# Patient Record
Sex: MALE | Race: WHITE | Employment: OTHER | URBAN - METROPOLITAN AREA
[De-identification: names, ages, dates, MRNs, and addresses within clinical notes are randomized per-mention and may not be internally consistent; named-entity substitution may affect disease eponyms.]

---

## 2018-09-19 ENCOUNTER — OFFICE VISIT (OUTPATIENT)
Dept: FAMILY MEDICINE CLINIC | Age: 43
End: 2018-09-19
Payer: COMMERCIAL

## 2018-09-19 ENCOUNTER — HOSPITAL ENCOUNTER (OUTPATIENT)
Age: 43
Setting detail: SPECIMEN
Discharge: HOME | End: 2018-09-19
Payer: COMMERCIAL

## 2018-09-19 VITALS
OXYGEN SATURATION: 95 % | BODY MASS INDEX: 38.16 KG/M2 | SYSTOLIC BLOOD PRESSURE: 132 MMHG | HEART RATE: 95 BPM | HEIGHT: 69 IN | TEMPERATURE: 98.3 F | WEIGHT: 257.6 LBS | DIASTOLIC BLOOD PRESSURE: 78 MMHG

## 2018-09-19 DIAGNOSIS — E78.5 HYPERLIPIDEMIA, UNSPECIFIED HYPERLIPIDEMIA TYPE: ICD-10-CM

## 2018-09-19 DIAGNOSIS — F41.9 ANXIETY: ICD-10-CM

## 2018-09-19 DIAGNOSIS — Z23 NEED FOR TDAP VACCINATION: ICD-10-CM

## 2018-09-19 DIAGNOSIS — Z23 NEED FOR PNEUMOCOCCAL VACCINATION: ICD-10-CM

## 2018-09-19 DIAGNOSIS — E11.8 TYPE 2 DIABETES MELLITUS WITH COMPLICATION, UNSPECIFIED WHETHER LONG TERM INSULIN USE: Primary | ICD-10-CM

## 2018-09-19 DIAGNOSIS — Z23 NEED FOR INFLUENZA VACCINATION: ICD-10-CM

## 2018-09-19 DIAGNOSIS — G89.29 CHRONIC ABDOMINAL PAIN: ICD-10-CM

## 2018-09-19 DIAGNOSIS — Z13.29 SCREENING FOR THYROID DISORDER: ICD-10-CM

## 2018-09-19 DIAGNOSIS — M10.9 GOUT, UNSPECIFIED CAUSE, UNSPECIFIED CHRONICITY, UNSPECIFIED SITE: ICD-10-CM

## 2018-09-19 DIAGNOSIS — Z13.31 POSITIVE DEPRESSION SCREENING: ICD-10-CM

## 2018-09-19 DIAGNOSIS — Z80.0 FH: COLON CANCER IN FIRST DEGREE RELATIVE <60 YEARS OLD: ICD-10-CM

## 2018-09-19 DIAGNOSIS — R10.9 CHRONIC ABDOMINAL PAIN: ICD-10-CM

## 2018-09-19 PROBLEM — E11.9 DIABETES MELLITUS (HCC): Status: ACTIVE | Noted: 2018-09-19

## 2018-09-19 LAB
CHOLESTEROL/HDL RATIO: 8.5
CHOLESTEROL: 204 MG/DL
CREATININE URINE POCT: 200
HBA1C MFR BLD: 6.7 %
HDLC SERPL-MCNC: 24 MG/DL
LDL CHOLESTEROL DIRECT: 49 MG/DL
LDL CHOLESTEROL: ABNORMAL MG/DL (ref 0–130)
MICROALBUMIN/CREAT 24H UR: 150 MG/G{CREAT}
MICROALBUMIN/CREAT UR-RTO: ABNORMAL
TRIGL SERPL-MCNC: 1694 MG/DL
TSH SERPL DL<=0.05 MIU/L-ACNC: 3.05 MIU/L (ref 0.3–5)
VLDLC SERPL CALC-MCNC: ABNORMAL MG/DL (ref 1–30)

## 2018-09-19 PROCEDURE — 2022F DILAT RTA XM EVC RTNOPTHY: CPT | Performed by: NURSE PRACTITIONER

## 2018-09-19 PROCEDURE — G8417 CALC BMI ABV UP PARAM F/U: HCPCS | Performed by: NURSE PRACTITIONER

## 2018-09-19 PROCEDURE — 82044 UR ALBUMIN SEMIQUANTITATIVE: CPT | Performed by: NURSE PRACTITIONER

## 2018-09-19 PROCEDURE — 90715 TDAP VACCINE 7 YRS/> IM: CPT | Performed by: NURSE PRACTITIONER

## 2018-09-19 PROCEDURE — 90688 IIV4 VACCINE SPLT 0.5 ML IM: CPT | Performed by: NURSE PRACTITIONER

## 2018-09-19 PROCEDURE — 83036 HEMOGLOBIN GLYCOSYLATED A1C: CPT | Performed by: NURSE PRACTITIONER

## 2018-09-19 PROCEDURE — G0008 ADMIN INFLUENZA VIRUS VAC: HCPCS | Performed by: NURSE PRACTITIONER

## 2018-09-19 PROCEDURE — G0009 ADMIN PNEUMOCOCCAL VACCINE: HCPCS | Performed by: NURSE PRACTITIONER

## 2018-09-19 PROCEDURE — 3044F HG A1C LEVEL LT 7.0%: CPT | Performed by: NURSE PRACTITIONER

## 2018-09-19 PROCEDURE — G8427 DOCREV CUR MEDS BY ELIG CLIN: HCPCS | Performed by: NURSE PRACTITIONER

## 2018-09-19 PROCEDURE — 1036F TOBACCO NON-USER: CPT | Performed by: NURSE PRACTITIONER

## 2018-09-19 PROCEDURE — 90471 IMMUNIZATION ADMIN: CPT | Performed by: NURSE PRACTITIONER

## 2018-09-19 PROCEDURE — G8431 POS CLIN DEPRES SCRN F/U DOC: HCPCS | Performed by: NURSE PRACTITIONER

## 2018-09-19 PROCEDURE — 96160 PT-FOCUSED HLTH RISK ASSMT: CPT | Performed by: NURSE PRACTITIONER

## 2018-09-19 PROCEDURE — 99204 OFFICE O/P NEW MOD 45 MIN: CPT | Performed by: NURSE PRACTITIONER

## 2018-09-19 PROCEDURE — 90732 PPSV23 VACC 2 YRS+ SUBQ/IM: CPT | Performed by: NURSE PRACTITIONER

## 2018-09-19 RX ORDER — ONDANSETRON 4 MG/1
4 TABLET, FILM COATED ORAL EVERY 8 HOURS PRN
Qty: 40 TABLET | Refills: 1 | Status: SHIPPED | OUTPATIENT
Start: 2018-09-19 | End: 2018-11-13 | Stop reason: SDUPTHER

## 2018-09-19 RX ORDER — TAMSULOSIN HYDROCHLORIDE 0.4 MG/1
0.4 CAPSULE ORAL DAILY
COMMUNITY
End: 2018-09-19 | Stop reason: SDUPTHER

## 2018-09-19 RX ORDER — HYDROXYZINE HYDROCHLORIDE 25 MG/1
25 TABLET, FILM COATED ORAL EVERY 4 HOURS PRN
COMMUNITY
End: 2018-09-19 | Stop reason: SDUPTHER

## 2018-09-19 RX ORDER — OMEPRAZOLE 20 MG/1
20 CAPSULE, DELAYED RELEASE ORAL DAILY
Qty: 30 CAPSULE | Refills: 5 | Status: SHIPPED | OUTPATIENT
Start: 2018-09-19 | End: 2019-01-30 | Stop reason: SDUPTHER

## 2018-09-19 RX ORDER — AMOXICILLIN 500 MG
1200 CAPSULE ORAL 2 TIMES DAILY
COMMUNITY
End: 2018-09-19 | Stop reason: SDUPTHER

## 2018-09-19 RX ORDER — AMOXICILLIN 500 MG
1200 CAPSULE ORAL 2 TIMES DAILY
Qty: 60 CAPSULE | Refills: 5 | Status: SHIPPED | OUTPATIENT
Start: 2018-09-19 | End: 2019-03-12 | Stop reason: SDUPTHER

## 2018-09-19 RX ORDER — NIACIN 1000 MG
TABLET, EXTENDED RELEASE ORAL
COMMUNITY
End: 2018-09-19 | Stop reason: SDUPTHER

## 2018-09-19 RX ORDER — GLIMEPIRIDE 2 MG/1
1 TABLET ORAL 2 TIMES DAILY
Qty: 60 TABLET | Refills: 5 | Status: SHIPPED | OUTPATIENT
Start: 2018-09-19 | End: 2018-12-03 | Stop reason: SDUPTHER

## 2018-09-19 RX ORDER — SIMVASTATIN 80 MG
80 TABLET ORAL NIGHTLY
Qty: 30 TABLET | Refills: 5 | Status: SHIPPED | OUTPATIENT
Start: 2018-09-19 | End: 2018-12-03 | Stop reason: SDUPTHER

## 2018-09-19 RX ORDER — TAMSULOSIN HYDROCHLORIDE 0.4 MG/1
0.4 CAPSULE ORAL DAILY
Qty: 30 CAPSULE | Refills: 5 | Status: SHIPPED | OUTPATIENT
Start: 2018-09-19 | End: 2018-12-03 | Stop reason: SDUPTHER

## 2018-09-19 RX ORDER — GLIMEPIRIDE 2 MG/1
1 TABLET ORAL 2 TIMES DAILY
COMMUNITY
End: 2018-09-19 | Stop reason: SDUPTHER

## 2018-09-19 RX ORDER — ALBUTEROL SULFATE 90 UG/1
2 AEROSOL, METERED RESPIRATORY (INHALATION) EVERY 6 HOURS PRN
COMMUNITY
End: 2018-12-03 | Stop reason: SDUPTHER

## 2018-09-19 RX ORDER — ONDANSETRON 4 MG/1
4 TABLET, FILM COATED ORAL EVERY 8 HOURS PRN
COMMUNITY
End: 2018-09-19 | Stop reason: SDUPTHER

## 2018-09-19 RX ORDER — SIMVASTATIN 80 MG
80 TABLET ORAL NIGHTLY
COMMUNITY
End: 2018-09-19 | Stop reason: SDUPTHER

## 2018-09-19 RX ORDER — HYDROXYZINE HYDROCHLORIDE 25 MG/1
25 TABLET, FILM COATED ORAL EVERY 4 HOURS PRN
Qty: 120 TABLET | Refills: 5 | Status: SHIPPED | OUTPATIENT
Start: 2018-09-19 | End: 2018-12-20 | Stop reason: SDUPTHER

## 2018-09-19 RX ORDER — NIACIN 1000 MG
1000 TABLET, EXTENDED RELEASE ORAL DAILY
Qty: 30 TABLET | Refills: 5 | Status: SHIPPED | OUTPATIENT
Start: 2018-09-19 | End: 2019-01-30 | Stop reason: SDUPTHER

## 2018-09-19 RX ORDER — OMEPRAZOLE 20 MG/1
20 CAPSULE, DELAYED RELEASE ORAL DAILY
COMMUNITY
End: 2018-09-19 | Stop reason: SDUPTHER

## 2018-09-19 ASSESSMENT — PATIENT HEALTH QUESTIONNAIRE - PHQ9
7. TROUBLE CONCENTRATING ON THINGS, SUCH AS READING THE NEWSPAPER OR WATCHING TELEVISION: 1
4. FEELING TIRED OR HAVING LITTLE ENERGY: 3
9. THOUGHTS THAT YOU WOULD BE BETTER OFF DEAD, OR OF HURTING YOURSELF: 1
SUM OF ALL RESPONSES TO PHQ QUESTIONS 1-9: 16
8. MOVING OR SPEAKING SO SLOWLY THAT OTHER PEOPLE COULD HAVE NOTICED. OR THE OPPOSITE, BEING SO FIGETY OR RESTLESS THAT YOU HAVE BEEN MOVING AROUND A LOT MORE THAN USUAL: 2
6. FEELING BAD ABOUT YOURSELF - OR THAT YOU ARE A FAILURE OR HAVE LET YOURSELF OR YOUR FAMILY DOWN: 1
5. POOR APPETITE OR OVEREATING: 3
10. IF YOU CHECKED OFF ANY PROBLEMS, HOW DIFFICULT HAVE THESE PROBLEMS MADE IT FOR YOU TO DO YOUR WORK, TAKE CARE OF THINGS AT HOME, OR GET ALONG WITH OTHER PEOPLE: 1
SUM OF ALL RESPONSES TO PHQ9 QUESTIONS 1 & 2: 3
1. LITTLE INTEREST OR PLEASURE IN DOING THINGS: 2
SUM OF ALL RESPONSES TO PHQ QUESTIONS 1-9: 16
2. FEELING DOWN, DEPRESSED OR HOPELESS: 1
3. TROUBLE FALLING OR STAYING ASLEEP: 2

## 2018-09-19 ASSESSMENT — ENCOUNTER SYMPTOMS
SHORTNESS OF BREATH: 0
COUGH: 0

## 2018-09-19 NOTE — PROGRESS NOTES
On the basis of positive PHQ-9 screening (PHQ-9 Total Score: 16), the following plan was implemented: referral to psychiatry provided. Patient will follow-up in 1 month(s) with PCP.

## 2018-09-19 NOTE — PROGRESS NOTES
[address] [place]  [phone]    Ashwini Aldana is a 37 y.o. male who presents today for his  medical conditions/complaints as noted below. Ashwini Aldana is c/o of Establish Care    HPI:     HPI   Pietro Salidvar is here to establish. On disability for mood. Dm- eating is an issue, on food stamps- states he cant aford healthy foods. Last a1c was \"9 something\". a1c today is 6.7. doesnt test sugars frequently. Not getting any exercise \"i'm really lazy\". Just eats and plays video games. Pt reports he is having stomach issues. States two months ago he was having cramps near upper and left side abd. States he had muscle twitching. He was nauseous. Was seen in er for this problem. States problem has gotten better. He was given omeprazole and Zofran from er. These help and when he doesn't take them he can tell a difference. He has not been seen by GI. He does have a family h/o colon cancer, dad had two recurrences of colon cancer with the second one killing him in his early 63's. Not seeing anyone right now for psych. Trying to get in with someone new. He will call and make another apt because he now gets medical cab. Does not want to be restarted on wellbutrin or seroquel, states these gave him impotence and he would prefer to talk to psych. Nursing note reviewed and discussed with patient. Patient's medications, allergies, past medical, surgical, social and family histories were reviewed and updated as appropriate.     Current Outpatient Prescriptions   Medication Sig Dispense Refill    albuterol sulfate  (90 Base) MCG/ACT inhaler Inhale 2 puffs into the lungs every 6 hours as needed for Wheezing      Niacin ER 1000 MG TBCR Take 1,000 mg by mouth daily 30 tablet 5    tamsulosin (FLOMAX) 0.4 MG capsule Take 1 capsule by mouth daily 30 capsule 5    metFORMIN (GLUCOPHAGE) 1000 MG tablet Take 1 tablet by mouth 2 times 132/78 (Site: Left Upper Arm, Position: Sitting, Cuff Size: Medium Adult)   Pulse 95   Temp 98.3 °F (36.8 °C) (Oral)   Ht 5' 9\" (1.753 m)   Wt 257 lb 9.6 oz (116.8 kg)   SpO2 95%   BMI 38.04 kg/m²      Wt Readings from Last 3 Encounters:   09/19/18 257 lb 9.6 oz (116.8 kg)   05/05/16 265 lb (120.2 kg)       Physical Exam   Constitutional: He is oriented to person, place, and time. He appears well-developed and well-nourished. No distress. Cardiovascular: Normal rate, regular rhythm and normal heart sounds. Pulmonary/Chest: Effort normal and breath sounds normal.   Neurological: He is alert and oriented to person, place, and time. Skin: Skin is warm and dry. Psychiatric: He has a normal mood and affect. Assessment/PLAN     1. Type 2 diabetes mellitus with complication, unspecified whether long term insulin use (HCC)  Continue meds  Diet and exercise discussed  Good control today   - POCT glycosylated hemoglobin (Hb A1C)  - POCT microalbumin  - metFORMIN (GLUCOPHAGE) 1000 MG tablet; Take 1 tablet by mouth 2 times daily (with meals)  Dispense: 60 tablet; Refill: 5  - linagliptin (TRADJENTA) 5 MG tablet; Take 1 tablet by mouth daily  Dispense: 30 tablet; Refill: 5  - glimepiride (AMARYL) 2 MG tablet; Take 0.5 tablets by mouth 2 times daily  Dispense: 60 tablet; Refill: 5  - aspirin 81 MG tablet; Take 1 tablet by mouth daily  Dispense: 30 tablet; Refill: 5    2. Need for Tdap vaccination    - Tdap (age 10y-63y) IM (ADACEL)    3. Need for influenza vaccination    - INFLUENZA, QUADV, 3 YRS AND OLDER, IM, MDV, 0.5ML ([facility])    4. Need for pneumococcal vaccination    - Pneumococcal polysaccharide vaccine 23-valent greater than or equal to 1yo subcutaneous/IM    5. Gout, unspecified cause, unspecified chronicity, unspecified site  Stable     6. Hyperlipidemia, unspecified hyperlipidemia type    - Lipid Panel; Future  - simvastatin (ZOCOR) 80 MG tablet;  Take 1 tablet by mouth nightly  Dispense: 30 tablet; Refill: 5  - Omega-3 Fatty Acids (FISH OIL) 1200 MG CAPS; Take 1,200 mg by mouth 2 times daily  Dispense: 60 capsule; Refill: 5    7. Positive depression screening  Is going to call and make apt with psych   - Positive Screen for Clinical Depression with a Documented Follow-up Plan     8. Chronic abdominal pain  Feel it would be best that he be seen by gi   - ondansetron (ZOFRAN) 4 MG tablet; Take 1 tablet by mouth every 8 hours as needed for Nausea or Vomiting  Dispense: 40 tablet; Refill: 1  - omeprazole (PRILOSEC) 20 MG delayed release capsule; Take 1 capsule by mouth daily  Dispense: 30 capsule; Refill: 5  - Svetlana Blackburn MD, [facility] [place]    9. Anxiety    - hydrOXYzine (ATARAX) 25 MG tablet; Take 1 tablet by mouth every 4 hours as needed for Anxiety  Dispense: 120 tablet; Refill: 5    10. FH: colon cancer in first degree relative <61years old    - Svetlana Blackburn MD, [facility] [place]    11. Screening for thyroid disorder    - TSH; Future      RTO if symptoms worsen or fail to improve  Pt agreeable with plan      Patient given educational materials - see patient instructions. Discussed use, benefit, and side effects of prescribed medications. All patient questions answered. Pt voiced understanding. Reviewed health maintenance. Instructed to continue current medications, diet and exercise. 1.  Neal received counseling on the following healthy behaviors: nutrition, exercise and medication adherence  2. Patient given educational materials when available - see patient instructions when applicable  3. Discussed use, benefit, and side effects of prescribed medications. Barriers to medication compliance addressed. All patient questions answered. Pt voiced understanding. 4.  Reviewed prior labs and health maintenance  5. Continue current medications, diet and exercise.     Completed Refills   Requested Prescriptions     Signed Prescriptions Disp Refills

## 2018-09-19 NOTE — PROGRESS NOTES
Patient is present to establish care    Patient was going to [address] [place]  Patient was not happy with how he was treated there    Patient is requesting referral to GI  Patient states he has been having heartburn and loss of appetite   Patient also has family history of colon cancer    Patient is due for Lipid    Pharmacy and medication reviewed with patient  Patient brought medication bottles    Visit Information    Have you changed or started any medications since your last visit including any over-the-counter medicines, vitamins, or herbal medicines? no   Have you stopped taking any of your medications? Is so, why? -  no  Are you having any side effects from any of your medications? - no    Have you seen any other physician or provider since your last visit?  no   Have you had any other diagnostic tests since your last visit?  no   Have you been seen in the emergency room and/or had an admission in a hospital since we last saw you?  no   Have you had your routine dental cleaning in the past 6 months?  no     Do you have an active [facility] account? If no, what is the barrier?   Yes    No care team member to display    Medical History Review  Past Medical, Family, and Social History reviewed and does contribute to the patient presenting condition    Health Maintenance   Topic Date Due    Diabetic foot exam  06/16/1985    Diabetic retinal exam  06/16/1985    HIV screen  06/16/1990    DTaP/Tdap/Td vaccine (1 - Tdap) 06/16/1994    Pneumococcal med risk (1 of 1 - PPSV23) 06/16/1994    Diabetic microalbuminuria test  09/05/2013    Lipid screen  09/05/2013    A1C test (Diabetic or Prediabetic)  08/21/2015    Flu vaccine (1) 09/01/2018

## 2018-10-18 RX ORDER — FENOFIBRATE 48 MG/1
48 TABLET, COATED ORAL DAILY
Qty: 30 TABLET | Refills: 3 | Status: SHIPPED | OUTPATIENT
Start: 2018-10-18 | End: 2019-06-08 | Stop reason: SDUPTHER

## 2018-10-23 ENCOUNTER — OFFICE VISIT (OUTPATIENT)
Dept: GASTROENTEROLOGY | Age: 43
End: 2018-10-23
Payer: COMMERCIAL

## 2018-10-23 VITALS
BODY MASS INDEX: 38.17 KG/M2 | DIASTOLIC BLOOD PRESSURE: 80 MMHG | SYSTOLIC BLOOD PRESSURE: 135 MMHG | HEART RATE: 110 BPM | WEIGHT: 258.5 LBS

## 2018-10-23 DIAGNOSIS — R19.7 DIARRHEA, UNSPECIFIED TYPE: ICD-10-CM

## 2018-10-23 DIAGNOSIS — R11.2 NAUSEA AND VOMITING, INTRACTABILITY OF VOMITING NOT SPECIFIED, UNSPECIFIED VOMITING TYPE: ICD-10-CM

## 2018-10-23 DIAGNOSIS — R10.9 CHRONIC ABDOMINAL PAIN: Primary | ICD-10-CM

## 2018-10-23 DIAGNOSIS — G89.29 CHRONIC ABDOMINAL PAIN: Primary | ICD-10-CM

## 2018-10-23 PROCEDURE — 99204 OFFICE O/P NEW MOD 45 MIN: CPT | Performed by: INTERNAL MEDICINE

## 2018-10-23 PROCEDURE — G8417 CALC BMI ABV UP PARAM F/U: HCPCS | Performed by: INTERNAL MEDICINE

## 2018-10-23 PROCEDURE — G8427 DOCREV CUR MEDS BY ELIG CLIN: HCPCS | Performed by: INTERNAL MEDICINE

## 2018-10-23 PROCEDURE — G8482 FLU IMMUNIZE ORDER/ADMIN: HCPCS | Performed by: INTERNAL MEDICINE

## 2018-10-23 RX ORDER — POLYETHYLENE GLYCOL 3350 17 G/17G
POWDER, FOR SOLUTION ORAL
Qty: 255 G | Refills: 0 | Status: SHIPPED | OUTPATIENT
Start: 2018-10-23 | End: 2018-11-22

## 2018-10-23 ASSESSMENT — ENCOUNTER SYMPTOMS
RESPIRATORY NEGATIVE: 1
ABDOMINAL PAIN: 1
ANAL BLEEDING: 0
EYES NEGATIVE: 1
BLOOD IN STOOL: 0
VOMITING: 1
CONSTIPATION: 0
NAUSEA: 1
DIARRHEA: 0
ABDOMINAL DISTENTION: 0
RECTAL PAIN: 0

## 2018-10-23 NOTE — PROGRESS NOTES
for dizziness and light-headedness. Negative for weakness and headaches. Hematological: Does not bruise/bleed easily. Psychiatric/Behavioral: Positive for agitation, behavioral problems and sleep disturbance. The patient is nervous/anxious. Objective:   Physical Exam   Constitutional: He is oriented to person, place, and time. He appears well-developed and well-nourished. No distress. HENT:   Head: Normocephalic and atraumatic. Mouth/Throat: No oropharyngeal exudate. Eyes: Pupils are equal, round, and reactive to light. Conjunctivae are normal. No scleral icterus. Neck: Normal range of motion. Neck supple. No tracheal deviation present. No thyromegaly present. Cardiovascular: Normal rate, regular rhythm, normal heart sounds and intact distal pulses. No murmur heard. Pulmonary/Chest: Effort normal and breath sounds normal. No respiratory distress. He has no wheezes. He has no rales. Abdominal: Soft. Bowel sounds are normal. He exhibits no distension, no ascites and no mass. There is no hepatomegaly. There is no tenderness. There is no guarding. No hernia. No peripheral signs of ch. Liver disease   Genitourinary: Rectum normal.   Musculoskeletal: He exhibits no edema. Lymphadenopathy:     He has no cervical adenopathy. Neurological: He is alert and oriented to person, place, and time. No cranial nerve deficit. Skin: Skin is warm and dry. No rash noted. No erythema. Psychiatric: Thought content normal.   Nursing note and vitals reviewed. Assessment:       Diagnosis Orders   1. Chronic abdominal pain  Lipase    Comprehensive Metabolic Panel    CBC Auto Differential    EGD   2. Nausea and vomiting, intractability of vomiting not specified, unspecified vomiting type     3. Diarrhea, unspecified type  Tissue Transglutaminase, IgA    COLONOSCOPY W/ OR W/O BIOPSY           Plan:        Patient has vague symptoms.   Multiple etiologies explained to the patient including IBS, gastritis,

## 2018-10-31 ENCOUNTER — OFFICE VISIT (OUTPATIENT)
Dept: FAMILY MEDICINE CLINIC | Age: 43
End: 2018-10-31
Payer: COMMERCIAL

## 2018-10-31 VITALS
OXYGEN SATURATION: 95 % | BODY MASS INDEX: 38.6 KG/M2 | SYSTOLIC BLOOD PRESSURE: 130 MMHG | TEMPERATURE: 97 F | HEART RATE: 83 BPM | WEIGHT: 261.4 LBS | DIASTOLIC BLOOD PRESSURE: 86 MMHG

## 2018-10-31 DIAGNOSIS — F41.9 ANXIETY: ICD-10-CM

## 2018-10-31 DIAGNOSIS — R35.0 FREQUENT URINATION: ICD-10-CM

## 2018-10-31 DIAGNOSIS — Q55.61 CURVATURE OF THE PENIS: Primary | ICD-10-CM

## 2018-10-31 PROCEDURE — 1036F TOBACCO NON-USER: CPT | Performed by: NURSE PRACTITIONER

## 2018-10-31 PROCEDURE — G8417 CALC BMI ABV UP PARAM F/U: HCPCS | Performed by: NURSE PRACTITIONER

## 2018-10-31 PROCEDURE — G8482 FLU IMMUNIZE ORDER/ADMIN: HCPCS | Performed by: NURSE PRACTITIONER

## 2018-10-31 PROCEDURE — 99214 OFFICE O/P EST MOD 30 MIN: CPT | Performed by: NURSE PRACTITIONER

## 2018-10-31 PROCEDURE — G8427 DOCREV CUR MEDS BY ELIG CLIN: HCPCS | Performed by: NURSE PRACTITIONER

## 2018-10-31 RX ORDER — RANITIDINE 150 MG/1
150 CAPSULE ORAL
COMMUNITY
End: 2019-01-30

## 2018-10-31 ASSESSMENT — ENCOUNTER SYMPTOMS
COUGH: 0
SHORTNESS OF BREATH: 0

## 2018-11-02 ENCOUNTER — TELEPHONE (OUTPATIENT)
Dept: GASTROENTEROLOGY | Age: 43
End: 2018-11-02

## 2018-11-13 DIAGNOSIS — G89.29 CHRONIC ABDOMINAL PAIN: ICD-10-CM

## 2018-11-13 DIAGNOSIS — R10.9 CHRONIC ABDOMINAL PAIN: ICD-10-CM

## 2018-11-13 RX ORDER — ONDANSETRON 4 MG/1
4 TABLET, FILM COATED ORAL EVERY 8 HOURS PRN
Qty: 40 TABLET | Refills: 1 | Status: SHIPPED | OUTPATIENT
Start: 2018-11-13 | End: 2019-01-30 | Stop reason: SDUPTHER

## 2018-11-27 ENCOUNTER — TELEPHONE (OUTPATIENT)
Dept: UROLOGY | Age: 43
End: 2018-11-27

## 2018-12-03 DIAGNOSIS — E11.8 TYPE 2 DIABETES MELLITUS WITH COMPLICATION, UNSPECIFIED WHETHER LONG TERM INSULIN USE: ICD-10-CM

## 2018-12-03 DIAGNOSIS — E78.5 HYPERLIPIDEMIA, UNSPECIFIED HYPERLIPIDEMIA TYPE: ICD-10-CM

## 2018-12-03 RX ORDER — NIACIN 1000 MG/1
TABLET, EXTENDED RELEASE ORAL
Qty: 90 TABLET | Refills: 1 | Status: SHIPPED | OUTPATIENT
Start: 2018-12-03 | End: 2019-04-15 | Stop reason: SDUPTHER

## 2018-12-03 RX ORDER — GLIMEPIRIDE 2 MG/1
TABLET ORAL
Qty: 90 TABLET | Refills: 1 | Status: SHIPPED | OUTPATIENT
Start: 2018-12-03 | End: 2019-06-13 | Stop reason: SDUPTHER

## 2018-12-03 RX ORDER — TAMSULOSIN HYDROCHLORIDE 0.4 MG/1
0.4 CAPSULE ORAL DAILY
Qty: 90 CAPSULE | Refills: 1 | Status: SHIPPED | OUTPATIENT
Start: 2018-12-03 | End: 2019-06-08 | Stop reason: SDUPTHER

## 2018-12-03 RX ORDER — SIMVASTATIN 80 MG
TABLET ORAL
Qty: 90 TABLET | Refills: 1 | Status: SHIPPED | OUTPATIENT
Start: 2018-12-03 | End: 2019-06-13 | Stop reason: SDUPTHER

## 2018-12-04 ENCOUNTER — HOSPITAL ENCOUNTER (OUTPATIENT)
Age: 43
Discharge: HOME | End: 2018-12-04
Payer: COMMERCIAL

## 2018-12-04 ENCOUNTER — OFFICE VISIT (OUTPATIENT)
Dept: GASTROENTEROLOGY | Age: 43
End: 2018-12-04
Payer: COMMERCIAL

## 2018-12-04 VITALS
BODY MASS INDEX: 37.95 KG/M2 | SYSTOLIC BLOOD PRESSURE: 134 MMHG | HEART RATE: 65 BPM | DIASTOLIC BLOOD PRESSURE: 84 MMHG | WEIGHT: 257 LBS

## 2018-12-04 DIAGNOSIS — R10.9 CHRONIC ABDOMINAL PAIN: Primary | ICD-10-CM

## 2018-12-04 DIAGNOSIS — G89.29 CHRONIC ABDOMINAL PAIN: ICD-10-CM

## 2018-12-04 DIAGNOSIS — R19.7 DIARRHEA, UNSPECIFIED TYPE: ICD-10-CM

## 2018-12-04 DIAGNOSIS — G89.29 CHRONIC ABDOMINAL PAIN: Primary | ICD-10-CM

## 2018-12-04 DIAGNOSIS — R10.9 CHRONIC ABDOMINAL PAIN: ICD-10-CM

## 2018-12-04 LAB
ABSOLUTE EOS #: 0.1 K/UL (ref 0–0.4)
ABSOLUTE IMMATURE GRANULOCYTE: ABNORMAL K/UL (ref 0–0.3)
ABSOLUTE LYMPH #: 2.6 K/UL (ref 1–4.8)
ABSOLUTE MONO #: 0.3 K/UL (ref 0.1–1.3)
ALBUMIN SERPL-MCNC: 4.5 G/DL (ref 3.5–5.2)
ALBUMIN/GLOBULIN RATIO: ABNORMAL (ref 1–2.5)
ALP BLD-CCNC: 68 U/L (ref 40–129)
ALT SERPL-CCNC: 32 U/L (ref 5–41)
ANION GAP SERPL CALCULATED.3IONS-SCNC: 13 MMOL/L (ref 9–17)
AST SERPL-CCNC: 24 U/L
BASOPHILS # BLD: 1 % (ref 0–2)
BASOPHILS ABSOLUTE: 0 K/UL (ref 0–0.2)
BILIRUB SERPL-MCNC: 0.54 MG/DL (ref 0.3–1.2)
BUN BLDV-MCNC: 9 MG/DL (ref 6–20)
BUN/CREAT BLD: ABNORMAL (ref 9–20)
CALCIUM SERPL-MCNC: 9.9 MG/DL (ref 8.6–10.4)
CHLORIDE BLD-SCNC: 101 MMOL/L (ref 98–107)
CO2: 26 MMOL/L (ref 20–31)
CREAT SERPL-MCNC: 0.85 MG/DL (ref 0.7–1.2)
DIFFERENTIAL TYPE: ABNORMAL
EOSINOPHILS RELATIVE PERCENT: 2 % (ref 0–4)
GFR AFRICAN AMERICAN: >60 ML/MIN
GFR NON-AFRICAN AMERICAN: >60 ML/MIN
GFR SERPL CREATININE-BSD FRML MDRD: ABNORMAL ML/MIN/{1.73_M2}
GFR SERPL CREATININE-BSD FRML MDRD: ABNORMAL ML/MIN/{1.73_M2}
GLUCOSE BLD-MCNC: 138 MG/DL (ref 70–99)
HCT VFR BLD CALC: 47.2 % (ref 41–53)
HEMOGLOBIN: 16.1 G/DL (ref 13.5–17.5)
IMMATURE GRANULOCYTES: ABNORMAL %
LIPASE: 33 U/L (ref 13–60)
LYMPHOCYTES # BLD: 47 % (ref 24–44)
MCH RBC QN AUTO: 29.9 PG (ref 26–34)
MCHC RBC AUTO-ENTMCNC: 34.1 G/DL (ref 31–37)
MCV RBC AUTO: 87.7 FL (ref 80–100)
MONOCYTES # BLD: 5 % (ref 1–7)
NRBC AUTOMATED: ABNORMAL PER 100 WBC
PDW BLD-RTO: 14.3 % (ref 11.5–14.9)
PLATELET # BLD: 142 K/UL (ref 150–450)
PLATELET ESTIMATE: ABNORMAL
PMV BLD AUTO: 9.4 FL (ref 6–12)
POTASSIUM SERPL-SCNC: 3.7 MMOL/L (ref 3.7–5.3)
RBC # BLD: 5.38 M/UL (ref 4.5–5.9)
RBC # BLD: ABNORMAL 10*6/UL
SEG NEUTROPHILS: 45 % (ref 36–66)
SEGMENTED NEUTROPHILS ABSOLUTE COUNT: 2.4 K/UL (ref 1.3–9.1)
SODIUM BLD-SCNC: 140 MMOL/L (ref 135–144)
TOTAL PROTEIN: 7.1 G/DL (ref 6.4–8.3)
WBC # BLD: 5.5 K/UL (ref 3.5–11)
WBC # BLD: ABNORMAL 10*3/UL

## 2018-12-04 PROCEDURE — 83516 IMMUNOASSAY NONANTIBODY: CPT

## 2018-12-04 PROCEDURE — G8482 FLU IMMUNIZE ORDER/ADMIN: HCPCS | Performed by: INTERNAL MEDICINE

## 2018-12-04 PROCEDURE — 83690 ASSAY OF LIPASE: CPT

## 2018-12-04 PROCEDURE — 1036F TOBACCO NON-USER: CPT | Performed by: INTERNAL MEDICINE

## 2018-12-04 PROCEDURE — 80053 COMPREHEN METABOLIC PANEL: CPT

## 2018-12-04 PROCEDURE — 85025 COMPLETE CBC W/AUTO DIFF WBC: CPT

## 2018-12-04 PROCEDURE — 99214 OFFICE O/P EST MOD 30 MIN: CPT | Performed by: INTERNAL MEDICINE

## 2018-12-04 PROCEDURE — G8417 CALC BMI ABV UP PARAM F/U: HCPCS | Performed by: INTERNAL MEDICINE

## 2018-12-04 PROCEDURE — 36415 COLL VENOUS BLD VENIPUNCTURE: CPT

## 2018-12-04 PROCEDURE — G8427 DOCREV CUR MEDS BY ELIG CLIN: HCPCS | Performed by: INTERNAL MEDICINE

## 2018-12-04 ASSESSMENT — ENCOUNTER SYMPTOMS
VOMITING: 1
COUGH: 0
ABDOMINAL DISTENTION: 0
SHORTNESS OF BREATH: 0
RESPIRATORY NEGATIVE: 1
EYES NEGATIVE: 1
RECTAL PAIN: 0
BLOOD IN STOOL: 0
TROUBLE SWALLOWING: 0
ANAL BLEEDING: 0
CONSTIPATION: 0
DIARRHEA: 0
ABDOMINAL PAIN: 1
NAUSEA: 1

## 2018-12-05 LAB — TISSUE TRANSGLUTAMINASE IGA: 0.4 U/ML

## 2018-12-05 RX ORDER — OMEPRAZOLE/SODIUM BICARBONATE 20MG-1.1G
CAPSULE ORAL
Qty: 90 CAPSULE | Refills: 1 | Status: SHIPPED | OUTPATIENT
Start: 2018-12-05 | End: 2019-03-25 | Stop reason: SDUPTHER

## 2018-12-11 RX ORDER — ONDANSETRON 4 MG/1
TABLET, ORALLY DISINTEGRATING ORAL
Qty: 90 TABLET | Refills: 1 | OUTPATIENT
Start: 2018-12-11

## 2018-12-20 DIAGNOSIS — F41.9 ANXIETY: ICD-10-CM

## 2018-12-21 RX ORDER — METFORMIN HYDROCHLORIDE EXTENDED-RELEASE TABLETS 1000 MG/1
TABLET, FILM COATED, EXTENDED RELEASE ORAL
Qty: 180 TABLET | Refills: 1 | Status: SHIPPED | OUTPATIENT
Start: 2018-12-21 | End: 2019-06-13 | Stop reason: SDUPTHER

## 2018-12-21 RX ORDER — HYDROXYZINE HYDROCHLORIDE 25 MG/1
TABLET, FILM COATED ORAL
Qty: 240 TABLET | Refills: 0 | Status: SHIPPED | OUTPATIENT
Start: 2018-12-21 | End: 2019-01-30 | Stop reason: SDUPTHER

## 2018-12-21 RX ORDER — LANCING DEVICE
EACH MISCELLANEOUS
Qty: 300 EACH | Refills: 5 | Status: SHIPPED | OUTPATIENT
Start: 2018-12-21 | End: 2021-02-25 | Stop reason: ALTCHOICE

## 2018-12-21 RX ORDER — CALCIUM CITRATE/VITAMIN D3 200MG-6.25
TABLET ORAL
Qty: 300 EACH | Refills: 5 | Status: SHIPPED | OUTPATIENT
Start: 2018-12-21 | End: 2021-02-25

## 2018-12-21 RX ORDER — LANCING DEVICE
EACH MISCELLANEOUS
Qty: 180 EACH | Refills: 1 | Status: SHIPPED | OUTPATIENT
Start: 2018-12-21 | End: 2021-02-25

## 2019-01-30 ENCOUNTER — OFFICE VISIT (OUTPATIENT)
Dept: FAMILY MEDICINE CLINIC | Age: 44
End: 2019-01-30
Payer: COMMERCIAL

## 2019-01-30 DIAGNOSIS — E11.8 TYPE 2 DIABETES MELLITUS WITH COMPLICATION, UNSPECIFIED WHETHER LONG TERM INSULIN USE: ICD-10-CM

## 2019-01-30 DIAGNOSIS — F31.9 BIPOLAR 1 DISORDER (HCC): Primary | ICD-10-CM

## 2019-01-30 DIAGNOSIS — R10.9 CHRONIC ABDOMINAL PAIN: ICD-10-CM

## 2019-01-30 DIAGNOSIS — K58.9 IRRITABLE BOWEL SYNDROME WITHOUT DIARRHEA: ICD-10-CM

## 2019-01-30 DIAGNOSIS — F41.9 ANXIETY: ICD-10-CM

## 2019-01-30 DIAGNOSIS — G89.29 CHRONIC ABDOMINAL PAIN: ICD-10-CM

## 2019-01-30 LAB — HBA1C MFR BLD: 7.2 %

## 2019-01-30 PROCEDURE — 2022F DILAT RTA XM EVC RTNOPTHY: CPT | Performed by: NURSE PRACTITIONER

## 2019-01-30 PROCEDURE — G8482 FLU IMMUNIZE ORDER/ADMIN: HCPCS | Performed by: NURSE PRACTITIONER

## 2019-01-30 PROCEDURE — 3045F PR MOST RECENT HEMOGLOBIN A1C LEVEL 7.0-9.0%: CPT | Performed by: NURSE PRACTITIONER

## 2019-01-30 PROCEDURE — G8427 DOCREV CUR MEDS BY ELIG CLIN: HCPCS | Performed by: NURSE PRACTITIONER

## 2019-01-30 PROCEDURE — 83036 HEMOGLOBIN GLYCOSYLATED A1C: CPT | Performed by: NURSE PRACTITIONER

## 2019-01-30 PROCEDURE — 1036F TOBACCO NON-USER: CPT | Performed by: NURSE PRACTITIONER

## 2019-01-30 PROCEDURE — G8417 CALC BMI ABV UP PARAM F/U: HCPCS | Performed by: NURSE PRACTITIONER

## 2019-01-30 PROCEDURE — 99214 OFFICE O/P EST MOD 30 MIN: CPT | Performed by: NURSE PRACTITIONER

## 2019-01-30 RX ORDER — RISPERIDONE 1 MG/1
1 TABLET, FILM COATED ORAL DAILY
Qty: 30 TABLET | Refills: 2 | Status: SHIPPED | OUTPATIENT
Start: 2019-01-30 | End: 2019-03-12

## 2019-01-30 RX ORDER — HYDROXYZINE 50 MG/1
50 TABLET, FILM COATED ORAL EVERY 6 HOURS PRN
Qty: 360 TABLET | Refills: 1 | Status: SHIPPED | OUTPATIENT
Start: 2019-01-30 | End: 2019-06-13 | Stop reason: SDUPTHER

## 2019-01-30 RX ORDER — ONDANSETRON 4 MG/1
4 TABLET, FILM COATED ORAL EVERY 8 HOURS PRN
Qty: 120 TABLET | Refills: 1 | Status: SHIPPED | OUTPATIENT
Start: 2019-01-30 | End: 2019-04-15 | Stop reason: SDUPTHER

## 2019-01-30 ASSESSMENT — ENCOUNTER SYMPTOMS
SHORTNESS OF BREATH: 0
COUGH: 0

## 2019-01-31 VITALS
WEIGHT: 261.2 LBS | HEART RATE: 93 BPM | OXYGEN SATURATION: 100 % | SYSTOLIC BLOOD PRESSURE: 140 MMHG | BODY MASS INDEX: 38.57 KG/M2 | DIASTOLIC BLOOD PRESSURE: 84 MMHG | TEMPERATURE: 97.8 F

## 2019-03-12 ENCOUNTER — OFFICE VISIT (OUTPATIENT)
Dept: FAMILY MEDICINE CLINIC | Age: 44
End: 2019-03-12
Payer: COMMERCIAL

## 2019-03-12 VITALS
TEMPERATURE: 97.7 F | OXYGEN SATURATION: 97 % | HEART RATE: 85 BPM | WEIGHT: 261 LBS | BODY MASS INDEX: 38.54 KG/M2 | SYSTOLIC BLOOD PRESSURE: 134 MMHG | DIASTOLIC BLOOD PRESSURE: 86 MMHG

## 2019-03-12 DIAGNOSIS — E78.5 HYPERLIPIDEMIA, UNSPECIFIED HYPERLIPIDEMIA TYPE: ICD-10-CM

## 2019-03-12 DIAGNOSIS — E11.8 TYPE 2 DIABETES MELLITUS WITH COMPLICATION, UNSPECIFIED WHETHER LONG TERM INSULIN USE: ICD-10-CM

## 2019-03-12 DIAGNOSIS — K58.9 IRRITABLE BOWEL SYNDROME WITHOUT DIARRHEA: ICD-10-CM

## 2019-03-12 DIAGNOSIS — R09.82 PND (POST-NASAL DRIP): Primary | ICD-10-CM

## 2019-03-12 PROCEDURE — 2022F DILAT RTA XM EVC RTNOPTHY: CPT | Performed by: NURSE PRACTITIONER

## 2019-03-12 PROCEDURE — G8427 DOCREV CUR MEDS BY ELIG CLIN: HCPCS | Performed by: NURSE PRACTITIONER

## 2019-03-12 PROCEDURE — G8482 FLU IMMUNIZE ORDER/ADMIN: HCPCS | Performed by: NURSE PRACTITIONER

## 2019-03-12 PROCEDURE — 1036F TOBACCO NON-USER: CPT | Performed by: NURSE PRACTITIONER

## 2019-03-12 PROCEDURE — 99214 OFFICE O/P EST MOD 30 MIN: CPT | Performed by: NURSE PRACTITIONER

## 2019-03-12 PROCEDURE — G8417 CALC BMI ABV UP PARAM F/U: HCPCS | Performed by: NURSE PRACTITIONER

## 2019-03-12 PROCEDURE — 3045F PR MOST RECENT HEMOGLOBIN A1C LEVEL 7.0-9.0%: CPT | Performed by: NURSE PRACTITIONER

## 2019-03-12 RX ORDER — AMOXICILLIN 500 MG
1200 CAPSULE ORAL 2 TIMES DAILY
Qty: 90 CAPSULE | Refills: 1 | Status: SHIPPED | OUTPATIENT
Start: 2019-03-12 | End: 2019-06-11 | Stop reason: SDUPTHER

## 2019-03-12 RX ORDER — CETIRIZINE HYDROCHLORIDE 10 MG/1
10 TABLET ORAL DAILY
Qty: 30 TABLET | Refills: 3 | Status: SHIPPED | OUTPATIENT
Start: 2019-03-12 | End: 2019-06-13 | Stop reason: SDUPTHER

## 2019-03-12 RX ORDER — OCTISALATE, AVOBENZONE, HOMOSALATE, AND OCTOCRYLENE 29.4; 29.4; 49; 25.48 MG/ML; MG/ML; MG/ML; MG/ML
1 LOTION TOPICAL DAILY
Qty: 30 CAPSULE | Refills: 5 | Status: SHIPPED | OUTPATIENT
Start: 2019-03-12 | End: 2020-02-12 | Stop reason: SDUPTHER

## 2019-03-12 ASSESSMENT — ENCOUNTER SYMPTOMS
COUGH: 0
SHORTNESS OF BREATH: 0

## 2019-03-19 RX ORDER — NIACIN 1000 MG/1
TABLET, EXTENDED RELEASE ORAL
Qty: 90 TABLET | Refills: 1 | OUTPATIENT
Start: 2019-03-19

## 2019-03-25 DIAGNOSIS — R11.2 NAUSEA AND VOMITING, INTRACTABILITY OF VOMITING NOT SPECIFIED, UNSPECIFIED VOMITING TYPE: ICD-10-CM

## 2019-03-25 DIAGNOSIS — G89.29 CHRONIC ABDOMINAL PAIN: Primary | ICD-10-CM

## 2019-03-25 DIAGNOSIS — R10.9 CHRONIC ABDOMINAL PAIN: Primary | ICD-10-CM

## 2019-03-25 RX ORDER — OMEPRAZOLE/SODIUM BICARBONATE 20MG-1.1G
CAPSULE ORAL
Qty: 90 CAPSULE | Refills: 1 | Status: SHIPPED | OUTPATIENT
Start: 2019-03-25 | End: 2019-09-10

## 2019-04-15 DIAGNOSIS — G89.29 CHRONIC ABDOMINAL PAIN: ICD-10-CM

## 2019-04-15 DIAGNOSIS — R10.9 CHRONIC ABDOMINAL PAIN: ICD-10-CM

## 2019-04-16 RX ORDER — ONDANSETRON 4 MG/1
4 TABLET, FILM COATED ORAL EVERY 8 HOURS PRN
Qty: 120 TABLET | Refills: 1 | Status: SHIPPED | OUTPATIENT
Start: 2019-04-16 | End: 2019-07-02 | Stop reason: SDUPTHER

## 2019-04-16 RX ORDER — NIACIN 1000 MG/1
TABLET, EXTENDED RELEASE ORAL
Qty: 90 TABLET | Refills: 1 | Status: SHIPPED | OUTPATIENT
Start: 2019-04-16 | End: 2019-09-26 | Stop reason: SDUPTHER

## 2019-05-29 ENCOUNTER — TELEPHONE (OUTPATIENT)
Dept: FAMILY MEDICINE CLINIC | Age: 44
End: 2019-05-29

## 2019-05-29 NOTE — TELEPHONE ENCOUNTER
I received a call from Piter Sy at [facility] @ [phone]. Calling to verify if the pt used any other medication prior to: Albuterol?

## 2019-06-07 DIAGNOSIS — F41.9 ANXIETY: ICD-10-CM

## 2019-06-07 DIAGNOSIS — F31.9 BIPOLAR 1 DISORDER (HCC): ICD-10-CM

## 2019-06-10 RX ORDER — RISPERIDONE 1 MG/1
1 TABLET, FILM COATED ORAL DAILY
Qty: 30 TABLET | Refills: 2 | OUTPATIENT
Start: 2019-06-10

## 2019-06-10 RX ORDER — TAMSULOSIN HYDROCHLORIDE 0.4 MG/1
0.4 CAPSULE ORAL DAILY
Qty: 90 CAPSULE | Refills: 1 | Status: SHIPPED | OUTPATIENT
Start: 2019-06-10 | End: 2020-02-12 | Stop reason: SDUPTHER

## 2019-06-10 RX ORDER — FENOFIBRATE 48 MG/1
48 TABLET, COATED ORAL DAILY
Qty: 90 TABLET | Refills: 1 | Status: SHIPPED | OUTPATIENT
Start: 2019-06-10 | End: 2020-02-12 | Stop reason: SDUPTHER

## 2019-06-10 NOTE — TELEPHONE ENCOUNTER
Last visit: 3/12/19  Last Med refill: 10/18/18, 12/3/18  Does patient have enough medication for 72 hours: No:     Next Visit Date:  Future Appointments   Date Time Provider Sonny Arenas   6/13/2019  1:30 PM Suzanna Damon APRN - CNP [facility] [address] Maintenance   Topic Date Due    Diabetic foot exam  06/16/1985    Hepatitis B Vaccine (1 of 3 - Risk 3-dose series) 06/16/1994    Diabetic retinal exam  09/19/2019 (Originally 6/16/1985)    HIV screen  09/19/2019 (Originally 6/16/1990)    Diabetic microalbuminuria test  09/19/2019    Lipid screen  09/19/2019    A1C test (Diabetic or Prediabetic)  01/30/2020    DTaP/Tdap/Td vaccine (2 - Td) 09/19/2028    Flu vaccine  Completed    Pneumococcal 0-64 years Vaccine  Completed       Hemoglobin A1C (%)   Date Value   01/30/2019 7.2   09/19/2018 6.7   08/21/2014 7.2 (H)             ( goal A1C is < 7)   Microalb/Crt. Ratio (mcg/mg creat)   Date Value   09/05/2012 27     LDL Cholesterol (mg/dL)   Date Value   09/19/2018 09/05/2012 Unable to calc.  as TRIG>400       (goal LDL is <100)   AST (U/L)   Date Value   12/04/2018 24     ALT (U/L)   Date Value   12/04/2018 32     BUN (mg/dL)   Date Value   12/04/2018 9     BP Readings from Last 3 Encounters:   03/12/19 134/86   01/30/19 (!) 140/84   12/04/18 134/84          (goal 120/80)    All Future Testing planned in [facility]  Lab Frequency Next Occurrence               Patient Active Problem List:     Diabetes mellitus ([address]     Gout     Hyperlipidemia     Chronic abdominal pain     Nausea & vomiting     Anxiety     Curvature of the penis     Bipolar 1 disorder ([address]

## 2019-06-11 DIAGNOSIS — E78.5 HYPERLIPIDEMIA, UNSPECIFIED HYPERLIPIDEMIA TYPE: ICD-10-CM

## 2019-06-11 NOTE — TELEPHONE ENCOUNTER
Last visit: 3/12/19  Last Med refill: 3/12/19  Does patient have enough medication for 72 hours: No:     Next Visit Date:  Future Appointments   Date Time Provider Sonny Arenas   6/13/2019  1:30 PM CLIF Curran - CNP [facility] [address] Maintenance   Topic Date Due    Diabetic foot exam  06/16/1985    Hepatitis B Vaccine (1 of 3 - Risk 3-dose series) 06/16/1994    Diabetic retinal exam  09/19/2019 (Originally 6/16/1985)    HIV screen  09/19/2019 (Originally 6/16/1990)    Diabetic microalbuminuria test  09/19/2019    Lipid screen  09/19/2019    A1C test (Diabetic or Prediabetic)  01/30/2020    DTaP/Tdap/Td vaccine (2 - Td) 09/19/2028    Flu vaccine  Completed    Pneumococcal 0-64 years Vaccine  Completed       Hemoglobin A1C (%)   Date Value   01/30/2019 7.2   09/19/2018 6.7   08/21/2014 7.2 (H)             ( goal A1C is < 7)   Microalb/Crt. Ratio (mcg/mg creat)   Date Value   09/05/2012 27     LDL Cholesterol (mg/dL)   Date Value   09/19/2018 09/05/2012 Unable to calc.  as TRIG>400       (goal LDL is <100)   AST (U/L)   Date Value   12/04/2018 24     ALT (U/L)   Date Value   12/04/2018 32     BUN (mg/dL)   Date Value   12/04/2018 9     BP Readings from Last 3 Encounters:   03/12/19 134/86   01/30/19 (!) 140/84   12/04/18 134/84          (goal 120/80)    All Future Testing planned in [facility]  Lab Frequency Next Occurrence               Patient Active Problem List:     Diabetes mellitus ([address]     Gout     Hyperlipidemia     Chronic abdominal pain     Nausea & vomiting     Anxiety     Curvature of the penis     Bipolar 1 disorder ([address]

## 2019-06-12 RX ORDER — AMOXICILLIN 500 MG
CAPSULE ORAL
Qty: 180 CAPSULE | Refills: 1 | Status: SHIPPED | OUTPATIENT
Start: 2019-06-12 | End: 2019-10-18 | Stop reason: SDUPTHER

## 2019-06-13 ENCOUNTER — OFFICE VISIT (OUTPATIENT)
Dept: FAMILY MEDICINE CLINIC | Age: 44
End: 2019-06-13
Payer: COMMERCIAL

## 2019-06-13 VITALS
WEIGHT: 260 LBS | TEMPERATURE: 98.3 F | HEART RATE: 103 BPM | SYSTOLIC BLOOD PRESSURE: 134 MMHG | BODY MASS INDEX: 38.4 KG/M2 | OXYGEN SATURATION: 96 % | DIASTOLIC BLOOD PRESSURE: 86 MMHG

## 2019-06-13 DIAGNOSIS — F41.9 ANXIETY: ICD-10-CM

## 2019-06-13 DIAGNOSIS — E78.5 HYPERLIPIDEMIA, UNSPECIFIED HYPERLIPIDEMIA TYPE: ICD-10-CM

## 2019-06-13 DIAGNOSIS — F31.9 BIPOLAR 1 DISORDER (HCC): ICD-10-CM

## 2019-06-13 DIAGNOSIS — E11.8 TYPE 2 DIABETES MELLITUS WITH COMPLICATION, UNSPECIFIED WHETHER LONG TERM INSULIN USE: Primary | ICD-10-CM

## 2019-06-13 DIAGNOSIS — Z76.0 MEDICATION REFILL: ICD-10-CM

## 2019-06-13 LAB — HBA1C MFR BLD: 8.2 %

## 2019-06-13 PROCEDURE — 3045F PR MOST RECENT HEMOGLOBIN A1C LEVEL 7.0-9.0%: CPT | Performed by: NURSE PRACTITIONER

## 2019-06-13 PROCEDURE — G8417 CALC BMI ABV UP PARAM F/U: HCPCS | Performed by: NURSE PRACTITIONER

## 2019-06-13 PROCEDURE — 83036 HEMOGLOBIN GLYCOSYLATED A1C: CPT | Performed by: NURSE PRACTITIONER

## 2019-06-13 PROCEDURE — 1036F TOBACCO NON-USER: CPT | Performed by: NURSE PRACTITIONER

## 2019-06-13 PROCEDURE — G8427 DOCREV CUR MEDS BY ELIG CLIN: HCPCS | Performed by: NURSE PRACTITIONER

## 2019-06-13 PROCEDURE — 2022F DILAT RTA XM EVC RTNOPTHY: CPT | Performed by: NURSE PRACTITIONER

## 2019-06-13 PROCEDURE — 99214 OFFICE O/P EST MOD 30 MIN: CPT | Performed by: NURSE PRACTITIONER

## 2019-06-13 RX ORDER — METFORMIN HYDROCHLORIDE EXTENDED-RELEASE TABLETS 1000 MG/1
TABLET, FILM COATED, EXTENDED RELEASE ORAL
Qty: 180 TABLET | Refills: 1 | Status: SHIPPED | OUTPATIENT
Start: 2019-06-13 | End: 2020-02-12 | Stop reason: SDUPTHER

## 2019-06-13 RX ORDER — SIMVASTATIN 80 MG
TABLET ORAL
Qty: 90 TABLET | Refills: 1 | Status: SHIPPED | OUTPATIENT
Start: 2019-06-13 | End: 2019-09-05 | Stop reason: SDUPTHER

## 2019-06-13 RX ORDER — GLIMEPIRIDE 2 MG/1
TABLET ORAL
Qty: 90 TABLET | Refills: 1 | Status: SHIPPED | OUTPATIENT
Start: 2019-06-13 | End: 2020-02-12 | Stop reason: SDUPTHER

## 2019-06-13 RX ORDER — HYDROXYZINE 50 MG/1
50 TABLET, FILM COATED ORAL EVERY 6 HOURS PRN
Qty: 360 TABLET | Refills: 1 | Status: SHIPPED | OUTPATIENT
Start: 2019-06-13 | End: 2020-02-12 | Stop reason: SDUPTHER

## 2019-06-13 RX ORDER — CETIRIZINE HYDROCHLORIDE 10 MG/1
10 TABLET ORAL DAILY
Qty: 30 TABLET | Refills: 3 | Status: SHIPPED | OUTPATIENT
Start: 2019-06-13 | End: 2019-09-10 | Stop reason: SDUPTHER

## 2019-06-13 ASSESSMENT — ENCOUNTER SYMPTOMS
SHORTNESS OF BREATH: 0
COUGH: 0

## 2019-06-13 NOTE — PATIENT INSTRUCTIONS
[email or web address] and sign in to your [employer] account. Enter W006 in the [employer] box to learn more about \"Back Stretches: Exercises. \"     If you do not have an account, please click on the \"Sign Up Now\" link. Current as of: September 20, 2018  Content Version: 12.0  © 6076-3606 [employer]. Care instructions adapted under license by [employer]. If you have questions about a medical condition or this instruction, always ask your healthcare professional. [address] any warranty or liability for your use of this information. Patient Education        Low Back Pain: Exercises  Your Care Instructions  Here are some examples of typical rehabilitation exercises for your condition. Start each exercise slowly. Ease off the exercise if you start to have pain. Your doctor or physical therapist will tell you when you can start these exercises and which ones will work best for you. How to do the exercises  Press-up    5. Lie on your stomach, supporting your body with your forearms. 6. Press your elbows down into the floor to raise your upper back. As you do this, relax your stomach muscles and allow your back to arch without using your back muscles. As your press up, do not let your hips or pelvis come off the floor. 7. Hold for 15 to 30 seconds, then relax. 8. Repeat 2 to 4 times. Alternate arm and leg (bird dog) exercise    5. Start on the floor, on your hands and knees. 6. Tighten your belly muscles. 7. Raise one leg off the floor, and hold it straight out behind you. Be careful not to let your hip drop down, because that will twist your trunk. 8. Hold for about 6 seconds, then lower your leg and switch to the other leg. 9. Repeat 8 to 12 times on each leg. 10. Over time, work up to holding for 10 to 30 seconds each time.   11. If you feel stable and secure with your leg raised, try raising the opposite arm straight out in front of you at the same time. Knee-to-chest exercise    5. Lie on your back with your knees bent and your feet flat on the floor. 6. Bring one knee to your chest, keeping the other foot flat on the floor (or keeping the other leg straight, whichever feels better on your lower back). 7. Keep your lower back pressed to the floor. Hold for at least 15 to 30 seconds. 8. Relax, and lower the knee to the starting position. 9. Repeat with the other leg. Repeat 2 to 4 times with each leg. 10. To get more stretch, put your other leg flat on the floor while pulling your knee to your chest.    Curl-ups    5. Lie on the floor on your back with your knees bent at a 90-degree angle. Your feet should be flat on the floor, about 12 inches from your buttocks. 6. Cross your arms over your chest. If this bothers your neck, try putting your hands behind your neck (not your head), with your elbows spread apart. 7. Slowly tighten your belly muscles and raise your shoulder blades off the floor. 8. Keep your head in line with your body, and do not press your chin to your chest.  9. Hold this position for 1 or 2 seconds, then slowly lower yourself back down to the floor. 10. Repeat 8 to 12 times. Pelvic tilt exercise    1. Lie on your back with your knees bent. 2. \"Brace\" your stomach. This means to tighten your muscles by pulling in and imagining your belly button moving toward your spine. You should feel like your back is pressing to the floor and your hips and pelvis are rocking back. 3. Hold for about 6 seconds while you breathe smoothly. 4. Repeat 8 to 12 times. Heel dig bridging    1. Lie on your back with both knees bent and your ankles bent so that only your heels are digging into the floor. Your knees should be bent about 90 degrees. 2. Then push your heels into the floor, squeeze your buttocks, and lift your hips off the floor until your shoulders, hips, and knees are all in a straight line.   3. Hold for about 6 [employer]. Care instructions adapted under license by [facility] ([facility]). If you have questions about a medical condition or this instruction, always ask your healthcare professional. [address] any warranty or liability for your use of this information.

## 2019-06-13 NOTE — PROGRESS NOTES
Patient is present for 3 month f/u for DM  Patient states he checks his BS occasionaly    Pharmacy and medication reviewed with patient    Visit Information    Have you changed or started any medications since your last visit including any over-the-counter medicines, vitamins, or herbal medicines? no   Have you stopped taking any of your medications? Is so, why? -  no  Are you having any side effects from any of your medications? - no    Have you seen any other physician or provider since your last visit?  no   Have you had any other diagnostic tests since your last visit?  no   Have you been seen in the emergency room and/or had an admission in a hospital since we last saw you?  no   Have you had your routine dental cleaning in the past 6 months?  no     Do you have an active MyChart account? If no, what is the barrier?   Yes    Patient Care Team:  CLIF Nugent CNP as PCP - General (Certified Nurse Practitioner)  CLIF Nugent CNP as PCP - [facility] Empaneled Provider  Rose Cavazos MD as Consulting Physician (Gastroenterology)    Medical History Review  Past Medical, Family, and Social History reviewed and does contribute to the patient presenting condition    Health Maintenance   Topic Date Due    Diabetic foot exam  06/16/1985    Hepatitis B Vaccine (1 of 3 - Risk 3-dose series) 06/16/1994    Diabetic retinal exam  09/19/2019 (Originally 6/16/1985)    HIV screen  09/19/2019 (Originally 6/16/1990)    Diabetic microalbuminuria test  09/19/2019    Lipid screen  09/19/2019    A1C test (Diabetic or Prediabetic)  01/30/2020    DTaP/Tdap/Td vaccine (2 - Td) 09/19/2028    Flu vaccine  Completed    Pneumococcal 0-64 years Vaccine  Completed

## 2019-06-13 NOTE — PROGRESS NOTES
[address] [place]  [phone]    Dewey Lanier is a 37 y.o. male who presents today for his  medical conditions/complaints as noted below. Dewey Lanier is c/o of 3 Month Follow-Up and Diabetes  . HPI:     HPI   Dm- a1c is 8.2  Taking tradjenta, metformin, glimperide. Weight is stable but obese. States he has a bad sweet tooth and he eats sweets everyday. Checks sugars occasionally. He realizes he needs to exercise self control. He is not going to get exercise because he does not like to do it alone. States he is having a lot of carpal tunnel problems because he \"games\" too much. Anxiety- stable. Wt Readings from Last 3 Encounters:   06/13/19 260 lb (117.9 kg)   03/12/19 261 lb (118.4 kg)   01/30/19 261 lb 3.2 oz (118.5 kg)     Nursing note reviewed and discussed with patient. Patient's medications, allergies, past medical, surgical, social and family histories were reviewed and updated asappropriate. Current Outpatient Medications   Medication Sig Dispense Refill    empagliflozin (JARDIANCE) 10 MG tablet Take 1 tablet by mouth daily 30 tablet 3    glimepiride (AMARYL) 2 MG tablet TAKE I/2 TABLET BY MOUTH TWICE DAILY 90 tablet 1    simvastatin (ZOCOR) 80 MG tablet TAKE ONE TABLET BY MOUTH ONE TIME A DAY . (REFILL REQUEST) 90 tablet 1    metFORMIN, OSM, (FORTAMET) 1000 MG extended release tablet TAKE ONE TABLET BY MOUTH TWICE A DAY WITH FOOD 180 tablet 1    hydrOXYzine (ATARAX) 50 MG tablet Take 1 tablet by mouth every 6 hours as needed for Itching 360 tablet 1    cetirizine (ZYRTEC ALLERGY) 10 MG tablet Take 1 tablet by mouth daily 30 tablet 3    aspirin 81 MG tablet Take 1 tablet by mouth daily 90 tablet 1    Omega-3 Fatty Acids (FISH OIL) 1200 MG CAPS TAKE ONE CAPSULE BY MOUTH TWICE A  capsule 1    tamsulosin (FLOMAX) 0.4 MG capsule TAKE 1 CAPSULE BY MOUTH DAILY 90 capsule 1    fenofibrate (TRICOR) 48 MG tablet TAKE 1 TABLET BY MOUTH DAILY 90 tablet 1    ondansetron (ZOFRAN) 4 MG tablet Take 1 tablet by mouth every 8 hours as needed for Nausea or Vomiting 120 tablet 1    niacin (NIASPAN) 1000 MG extended release tablet TAKE 1 TABLET BY MOUTH DAILY ALONG WITH ASPIRIN 90 tablet 1    Omeprazole-Sodium Bicarbonate  MG CAPS TAKE ONE CAPSULE BY MOUTH ONCE A DAY IN THE MORNING BEFORE FOOD 90 capsule 1    Blood Glucose Monitoring Suppl (TRUE METRIX METER) w/Device KIT USE TO TEST BLOOD SUGAR TWICE A DAY AND AS NEEDED 1 kit 0    PHARMACIST CHOICE LANCETS MISC USE TO TEST BLOOD SUGAR TWICE A DAY AND AS NEEDED 300 each 5    Lancet Devices ([employer] DIAGNOSTICS LANCING DEV) MISC USE TO TEST BLOOD SUGAR TWICE A DAY AND AS NEEDED 180 each 1    PHARMACIST CHOICE ALCOHOL 70 % PADS USE TO USE TO TEST BLOOD SUGAR TWICE A DAY AND AS NEEDED 200 each 5    TRUE METRIX BLOOD GLUCOSE TEST strip USE TO TEST BLOOD SUGAR TWICE A DAY AND AS NEEDED 300 each 5    PROAIR  (90 Base) MCG/ACT inhaler INHALE TWO PUFFS INTO THE LUNGS 4 TIMES A DAY AS NEEDED 17 g 1    Probiotic Product (ALIGN) 4 MG CAPS Take 1 tablet by mouth daily 30 capsule 5     No current facility-administered medications for this visit.       Past Medical History:   Diagnosis Date    ADHD (attention deficit hyperactivity disorder)     Anxiety     Bipolar 2 disorder (HCC)     BPH (benign prostatic hyperplasia)     Carpal tunnel syndrome     Gout     Hyperlipidemia     Type 2 diabetes mellitus without complication (HCC)       Past Surgical History:   Procedure Laterality Date    APPENDECTOMY       Family History   Problem Relation Age of Onset    Cancer Mother         cervical    Colon Cancer Father         passed at 58 with second episode of colon cancer    Diabetes Father     Stroke Maternal Grandfather     Diabetes Paternal Grandmother     Stroke Paternal Grandmother      Social History     Tobacco Use    Smoking status: Never Smoker    REQUEST)  Dispense: 90 tablet; Refill: 1    3. Anxiety    - hydrOXYzine (ATARAX) 50 MG tablet; Take 1 tablet by mouth every 6 hours as needed for Itching  Dispense: 360 tablet; Refill: 1    4. Bipolar 1 disorder (HCC)    - hydrOXYzine (ATARAX) 50 MG tablet; Take 1 tablet by mouth every 6 hours as needed for Itching  Dispense: 360 tablet; Refill: 1    5. Medication refill    - cetirizine (ZYRTEC ALLERGY) 10 MG tablet; Take 1 tablet by mouth daily  Dispense: 30 tablet; Refill: 3      RTO if symptoms worsen or fail to improve  Pt agreeable with plan      Patient given educational materials - see patientinstructions. Discussed use, benefit, and side effects of prescribed medications. All patient questions answered. Pt voiced understanding. Reviewed health maintenance. Instructed to continue current medications, diet andexercise. 1.  Neal received counseling on the following healthy behaviors: nutrition, exercise and medication adherence  2. Patient given educationalmaterials when available - see patient instructions when applicable  3. Discussed use, benefit, and side effects of prescribed medications. Barriersto medication compliance addressed. All patient questions answered. Pt voiced understanding. 4.  Reviewed prior labs and health maintenance  5. Continue current medications, diet and exercise. CompletedRefills   Requested Prescriptions     Signed Prescriptions Disp Refills    empagliflozin (JARDIANCE) 10 MG tablet 30 tablet 3     Sig: Take 1 tablet by mouth daily    glimepiride (AMARYL) 2 MG tablet 90 tablet 1     Sig: TAKE I/2 TABLET BY MOUTH TWICE DAILY    simvastatin (ZOCOR) 80 MG tablet 90 tablet 1     Sig: TAKE ONE TABLET BY MOUTH ONE TIME A DAY . (REFILL REQUEST)    metFORMIN, OSM, (FORTAMET) 1000 MG extended release tablet 180 tablet 1     Sig: TAKE ONE TABLET BY MOUTH TWICE A DAY WITH FOOD    hydrOXYzine (ATARAX) 50 MG tablet 360 tablet 1     Sig: Take 1 tablet by mouth every 6 hours as needed for Itching    cetirizine (ZYRTEC ALLERGY) 10 MG tablet 30 tablet 3     Sig: Take 1 tablet by mouth daily    aspirin 81 MG tablet 90 tablet 1     Sig: Take 1 tablet by mouth daily         Electronically signed by Wilfredo Mandel CNP on 6/13/2019 at 2:04 PM

## 2019-06-28 ENCOUNTER — TELEPHONE (OUTPATIENT)
Dept: FAMILY MEDICINE CLINIC | Age: 44
End: 2019-06-28

## 2019-06-28 NOTE — TELEPHONE ENCOUNTER
Patient called stating he is having nausea   Patient states this has been going on for months  Patient states he has been taking zofran with no relief  Patient states he usually sees GI for this but does not want to see them anymore. Referred patient to walk-in. Patient refused and then hung up.

## 2019-07-09 RX ORDER — TAMSULOSIN HYDROCHLORIDE 0.4 MG/1
0.4 CAPSULE ORAL DAILY
Qty: 90 CAPSULE | Refills: 1 | OUTPATIENT
Start: 2019-07-09

## 2019-08-08 DIAGNOSIS — E11.8 TYPE 2 DIABETES MELLITUS WITH COMPLICATION, UNSPECIFIED WHETHER LONG TERM INSULIN USE: ICD-10-CM

## 2019-08-08 RX ORDER — METFORMIN HYDROCHLORIDE EXTENDED-RELEASE TABLETS 1000 MG/1
TABLET, FILM COATED, EXTENDED RELEASE ORAL
Qty: 180 TABLET | Refills: 1 | OUTPATIENT
Start: 2019-08-08

## 2019-08-08 RX ORDER — TAMSULOSIN HYDROCHLORIDE 0.4 MG/1
0.4 CAPSULE ORAL DAILY
Qty: 90 CAPSULE | Refills: 1 | OUTPATIENT
Start: 2019-08-08

## 2019-08-09 RX ORDER — ALBUTEROL SULFATE 90 UG/1
AEROSOL, METERED RESPIRATORY (INHALATION)
Qty: 18 G | Refills: 5 | Status: SHIPPED | OUTPATIENT
Start: 2019-08-09 | End: 2020-02-12 | Stop reason: SDUPTHER

## 2019-08-22 DIAGNOSIS — E11.8 TYPE 2 DIABETES MELLITUS WITH COMPLICATION, UNSPECIFIED WHETHER LONG TERM INSULIN USE: ICD-10-CM

## 2019-08-22 RX ORDER — LINAGLIPTIN 5 MG/1
TABLET, FILM COATED ORAL
Qty: 90 TABLET | Refills: 1 | OUTPATIENT
Start: 2019-08-22

## 2019-09-02 DIAGNOSIS — E11.8 TYPE 2 DIABETES MELLITUS WITH COMPLICATION, UNSPECIFIED WHETHER LONG TERM INSULIN USE: ICD-10-CM

## 2019-09-03 RX ORDER — LINAGLIPTIN 5 MG/1
TABLET, FILM COATED ORAL
Qty: 90 TABLET | Refills: 1 | OUTPATIENT
Start: 2019-09-03

## 2019-09-05 DIAGNOSIS — G89.29 CHRONIC ABDOMINAL PAIN: ICD-10-CM

## 2019-09-05 DIAGNOSIS — E11.8 TYPE 2 DIABETES MELLITUS WITH COMPLICATION, UNSPECIFIED WHETHER LONG TERM INSULIN USE: ICD-10-CM

## 2019-09-05 DIAGNOSIS — R10.9 CHRONIC ABDOMINAL PAIN: ICD-10-CM

## 2019-09-05 DIAGNOSIS — E78.5 HYPERLIPIDEMIA, UNSPECIFIED HYPERLIPIDEMIA TYPE: ICD-10-CM

## 2019-09-05 RX ORDER — ASPIRIN 81 MG/1
TABLET, DELAYED RELEASE ORAL
Qty: 90 TABLET | Refills: 1 | Status: SHIPPED | OUTPATIENT
Start: 2019-09-05 | End: 2020-02-12 | Stop reason: SDUPTHER

## 2019-09-05 RX ORDER — SIMVASTATIN 80 MG
TABLET ORAL
Qty: 90 TABLET | Refills: 1 | Status: SHIPPED | OUTPATIENT
Start: 2019-09-05 | End: 2020-02-12 | Stop reason: SDUPTHER

## 2019-09-05 RX ORDER — ONDANSETRON 4 MG/1
4 TABLET, FILM COATED ORAL EVERY 8 HOURS PRN
Qty: 120 TABLET | Refills: 2 | Status: SHIPPED | OUTPATIENT
Start: 2019-09-05 | End: 2020-02-12 | Stop reason: SDUPTHER

## 2019-09-10 ENCOUNTER — OFFICE VISIT (OUTPATIENT)
Dept: FAMILY MEDICINE CLINIC | Age: 44
End: 2019-09-10
Payer: COMMERCIAL

## 2019-09-10 VITALS
OXYGEN SATURATION: 95 % | BODY MASS INDEX: 37.1 KG/M2 | TEMPERATURE: 98.9 F | DIASTOLIC BLOOD PRESSURE: 86 MMHG | HEART RATE: 86 BPM | WEIGHT: 251.2 LBS | SYSTOLIC BLOOD PRESSURE: 130 MMHG

## 2019-09-10 DIAGNOSIS — E11.8 TYPE 2 DIABETES MELLITUS WITH COMPLICATION, UNSPECIFIED WHETHER LONG TERM INSULIN USE: ICD-10-CM

## 2019-09-10 DIAGNOSIS — G89.29 CHRONIC ABDOMINAL PAIN: Primary | ICD-10-CM

## 2019-09-10 DIAGNOSIS — R10.9 CHRONIC ABDOMINAL PAIN: Primary | ICD-10-CM

## 2019-09-10 DIAGNOSIS — Z23 NEED FOR INFLUENZA VACCINATION: ICD-10-CM

## 2019-09-10 DIAGNOSIS — Z76.0 MEDICATION REFILL: ICD-10-CM

## 2019-09-10 LAB — HBA1C MFR BLD: 7.1 %

## 2019-09-10 PROCEDURE — G8427 DOCREV CUR MEDS BY ELIG CLIN: HCPCS | Performed by: NURSE PRACTITIONER

## 2019-09-10 PROCEDURE — 99214 OFFICE O/P EST MOD 30 MIN: CPT | Performed by: NURSE PRACTITIONER

## 2019-09-10 PROCEDURE — G8417 CALC BMI ABV UP PARAM F/U: HCPCS | Performed by: NURSE PRACTITIONER

## 2019-09-10 PROCEDURE — 90688 IIV4 VACCINE SPLT 0.5 ML IM: CPT | Performed by: NURSE PRACTITIONER

## 2019-09-10 PROCEDURE — G0008 ADMIN INFLUENZA VIRUS VAC: HCPCS | Performed by: NURSE PRACTITIONER

## 2019-09-10 PROCEDURE — 3045F PR MOST RECENT HEMOGLOBIN A1C LEVEL 7.0-9.0%: CPT | Performed by: NURSE PRACTITIONER

## 2019-09-10 PROCEDURE — 2022F DILAT RTA XM EVC RTNOPTHY: CPT | Performed by: NURSE PRACTITIONER

## 2019-09-10 PROCEDURE — 83036 HEMOGLOBIN GLYCOSYLATED A1C: CPT | Performed by: NURSE PRACTITIONER

## 2019-09-10 PROCEDURE — 1036F TOBACCO NON-USER: CPT | Performed by: NURSE PRACTITIONER

## 2019-09-10 RX ORDER — CETIRIZINE HYDROCHLORIDE 10 MG/1
10 TABLET ORAL DAILY
Qty: 90 TABLET | Refills: 1 | Status: SHIPPED | OUTPATIENT
Start: 2019-09-10 | End: 2020-02-12 | Stop reason: SDUPTHER

## 2019-09-10 ASSESSMENT — ENCOUNTER SYMPTOMS
SHORTNESS OF BREATH: 0
COUGH: 0

## 2019-09-10 NOTE — PROGRESS NOTES
[address] [place]  [phone]    Crystal Barnes is a 40 y.o. male who presents today for his  medical conditions/complaints as noted below. Crystal Barnes is c/o of 3 Month Follow-Up and Diabetes  . HPI:     HPI     Dm- a1c is 7.1. Taking jardiance and glimperide, metformin. On statin. Notices his stomach problems when he eats spicy foods. He states he notices his pain even he doesn't eat spicy foods. He feels there is a constant knot on the left side. He is eating more fiber. Taking probiotic. Only taking zofran prn. On gets nauseous when he runs out of marijuana. He has lost some weight states he has cut back on what he eats. He runs out of money for food. He is not having diarrhea or constipation. He is just trying to avoid spicy foods and his abd problems are better. Wt Readings from Last 3 Encounters:   09/10/19 251 lb 3.2 oz (113.9 kg)   06/13/19 260 lb (117.9 kg)   03/12/19 261 lb (118.4 kg)     Nursing note reviewed and discussed with patient. Patient's medications, allergies, past medical, surgical, social and family histories were reviewed and updated asappropriate. Current Outpatient Medications   Medication Sig Dispense Refill    empagliflozin (JARDIANCE) 10 MG tablet Take 1 tablet by mouth daily 90 tablet 1    cetirizine (ZYRTEC ALLERGY) 10 MG tablet Take 1 tablet by mouth daily 90 tablet 1    ondansetron (ZOFRAN) 4 MG tablet TAKE 1 TABLET BY MOUTH EVERY 8 HOURS AS NEEDED FOR NAUSEA OR VOMITING 120 tablet 2    simvastatin (ZOCOR) 80 MG tablet TAKE ONE TABLET BY MOUTH ONE TIME A DAY . (REFILL REQUEST) 90 tablet 1    SM ASPIRIN ADULT LOW STRENGTH 81 MG EC tablet TAKE 1 TABLET BY MOUTH DAILY 90 tablet 1    albuterol sulfate  (90 Base) MCG/ACT inhaler INHALE TWO PUFFS INTO THE LUNGS 4 TIMES A DAY AS NEEDED 18 g 5    glimepiride (AMARYL) 2 MG tablet TAKE I/2 TABLET BY MOUTH TWICE DAILY 90 tablet 1

## 2019-09-26 RX ORDER — FENOFIBRATE 48 MG/1
48 TABLET, COATED ORAL DAILY
Qty: 90 TABLET | Refills: 1 | OUTPATIENT
Start: 2019-09-26

## 2019-09-26 RX ORDER — TAMSULOSIN HYDROCHLORIDE 0.4 MG/1
0.4 CAPSULE ORAL DAILY
Qty: 90 CAPSULE | Refills: 1 | OUTPATIENT
Start: 2019-09-26

## 2019-09-27 RX ORDER — NIACIN 1000 MG/1
TABLET, EXTENDED RELEASE ORAL
Qty: 90 TABLET | Refills: 1 | Status: SHIPPED | OUTPATIENT
Start: 2019-09-27 | End: 2020-02-12 | Stop reason: SDUPTHER

## 2019-10-04 ENCOUNTER — TELEPHONE (OUTPATIENT)
Dept: FAMILY MEDICINE CLINIC | Age: 44
End: 2019-10-04

## 2019-10-18 DIAGNOSIS — E78.5 HYPERLIPIDEMIA, UNSPECIFIED HYPERLIPIDEMIA TYPE: ICD-10-CM

## 2019-10-18 RX ORDER — PSEUDOEPHEDRINE/ACETAMINOPHEN 30MG-500MG
TABLET ORAL
Qty: 180 CAPSULE | Refills: 1 | Status: SHIPPED | OUTPATIENT
Start: 2019-10-18 | End: 2020-02-12

## 2020-02-12 ENCOUNTER — HOSPITAL ENCOUNTER (OUTPATIENT)
Age: 45
Setting detail: SPECIMEN
Discharge: HOME | End: 2020-02-12
Payer: MEDICARE

## 2020-02-12 ENCOUNTER — OFFICE VISIT (OUTPATIENT)
Dept: PRIMARY CARE CLINIC | Age: 45
End: 2020-02-12
Payer: MEDICARE

## 2020-02-12 VITALS
WEIGHT: 250 LBS | HEIGHT: 69 IN | DIASTOLIC BLOOD PRESSURE: 90 MMHG | SYSTOLIC BLOOD PRESSURE: 140 MMHG | HEART RATE: 107 BPM | OXYGEN SATURATION: 96 % | BODY MASS INDEX: 37.03 KG/M2

## 2020-02-12 LAB
CREATININE URINE: 60.9 MG/DL (ref 39–259)
MICROALBUMIN/CREAT 24H UR: 143 MG/L
MICROALBUMIN/CREAT UR-RTO: 235 MCG/MG CREAT

## 2020-02-12 PROCEDURE — G8427 DOCREV CUR MEDS BY ELIG CLIN: HCPCS | Performed by: NURSE PRACTITIONER

## 2020-02-12 PROCEDURE — 2022F DILAT RTA XM EVC RTNOPTHY: CPT | Performed by: NURSE PRACTITIONER

## 2020-02-12 PROCEDURE — 99214 OFFICE O/P EST MOD 30 MIN: CPT | Performed by: NURSE PRACTITIONER

## 2020-02-12 PROCEDURE — G8417 CALC BMI ABV UP PARAM F/U: HCPCS | Performed by: NURSE PRACTITIONER

## 2020-02-12 PROCEDURE — 1036F TOBACCO NON-USER: CPT | Performed by: NURSE PRACTITIONER

## 2020-02-12 PROCEDURE — G8482 FLU IMMUNIZE ORDER/ADMIN: HCPCS | Performed by: NURSE PRACTITIONER

## 2020-02-12 PROCEDURE — 3046F HEMOGLOBIN A1C LEVEL >9.0%: CPT | Performed by: NURSE PRACTITIONER

## 2020-02-12 RX ORDER — GABAPENTIN 300 MG/1
300 CAPSULE ORAL 2 TIMES DAILY
Qty: 60 CAPSULE | Refills: 5 | Status: SHIPPED | OUTPATIENT
Start: 2020-02-12 | End: 2021-02-25

## 2020-02-12 RX ORDER — ONDANSETRON 4 MG/1
4 TABLET, FILM COATED ORAL EVERY 8 HOURS PRN
Qty: 120 TABLET | Refills: 2 | Status: SHIPPED | OUTPATIENT
Start: 2020-02-12 | End: 2021-02-25

## 2020-02-12 RX ORDER — GLIMEPIRIDE 2 MG/1
TABLET ORAL
Qty: 90 TABLET | Refills: 1 | Status: SHIPPED | OUTPATIENT
Start: 2020-02-12 | End: 2021-02-25

## 2020-02-12 RX ORDER — CETIRIZINE HYDROCHLORIDE 10 MG/1
10 TABLET ORAL DAILY
Qty: 90 TABLET | Refills: 1 | Status: SHIPPED | OUTPATIENT
Start: 2020-02-12 | End: 2021-02-25

## 2020-02-12 RX ORDER — TAMSULOSIN HYDROCHLORIDE 0.4 MG/1
0.4 CAPSULE ORAL DAILY
Qty: 90 CAPSULE | Refills: 1 | Status: SHIPPED | OUTPATIENT
Start: 2020-02-12 | End: 2021-02-25

## 2020-02-12 RX ORDER — METFORMIN HYDROCHLORIDE EXTENDED-RELEASE TABLETS 1000 MG/1
TABLET, FILM COATED, EXTENDED RELEASE ORAL
Qty: 180 TABLET | Refills: 1 | Status: SHIPPED | OUTPATIENT
Start: 2020-02-12 | End: 2020-04-24

## 2020-02-12 RX ORDER — ALBUTEROL SULFATE 90 UG/1
AEROSOL, METERED RESPIRATORY (INHALATION)
Qty: 18 G | Refills: 5 | Status: SHIPPED | OUTPATIENT
Start: 2020-02-12 | End: 2021-02-25

## 2020-02-12 RX ORDER — SIMVASTATIN 80 MG
TABLET ORAL
Qty: 90 TABLET | Refills: 1 | Status: SHIPPED | OUTPATIENT
Start: 2020-02-12 | End: 2021-02-25

## 2020-02-12 RX ORDER — NIACIN 1000 MG/1
TABLET, EXTENDED RELEASE ORAL
Qty: 90 TABLET | Refills: 1 | Status: SHIPPED | OUTPATIENT
Start: 2020-02-12 | End: 2021-02-25

## 2020-02-12 RX ORDER — HYDROXYZINE 50 MG/1
50 TABLET, FILM COATED ORAL EVERY 6 HOURS PRN
Qty: 360 TABLET | Refills: 1 | Status: SHIPPED | OUTPATIENT
Start: 2020-02-12 | End: 2021-02-25

## 2020-02-12 RX ORDER — FENOFIBRATE 48 MG/1
48 TABLET, COATED ORAL DAILY
Qty: 90 TABLET | Refills: 1 | Status: SHIPPED | OUTPATIENT
Start: 2020-02-12 | End: 2021-02-25

## 2020-02-12 RX ORDER — OCTISALATE, AVOBENZONE, HOMOSALATE, AND OCTOCRYLENE 29.4; 29.4; 49; 25.48 MG/ML; MG/ML; MG/ML; MG/ML
1 LOTION TOPICAL DAILY
Qty: 30 CAPSULE | Refills: 5 | Status: SHIPPED | OUTPATIENT
Start: 2020-02-12 | End: 2021-02-25

## 2020-02-12 RX ORDER — ASPIRIN 81 MG/1
TABLET ORAL
Qty: 90 TABLET | Refills: 1 | Status: SHIPPED | OUTPATIENT
Start: 2020-02-12 | End: 2021-02-25

## 2020-02-12 RX ORDER — ICOSAPENT ETHYL 1000 MG/1
1 CAPSULE ORAL 2 TIMES DAILY
Qty: 60 CAPSULE | Refills: 5 | Status: SHIPPED | OUTPATIENT
Start: 2020-02-12 | End: 2021-02-25

## 2020-02-12 ASSESSMENT — ENCOUNTER SYMPTOMS
SHORTNESS OF BREATH: 0
COUGH: 0

## 2020-02-12 NOTE — PROGRESS NOTES
counseling on the following healthy behaviors: nutrition, exercise and medication adherence  2. Patient given educationalmaterials when available - see patient instructions when applicable  3. Discussed use, benefit, and side effects of prescribed medications. Barriersto medication compliance addressed. All patient questions answered. Pt voiced understanding. 4.  Reviewed prior labs and health maintenance when applicable. 5.  Continue current medications, diet and exercise. CompletedRefills   Requested Prescriptions     Signed Prescriptions Disp Refills    Icosapent Ethyl (VASCEPA) 1 g CAPS capsule 60 capsule 5     Sig: Take 1 capsule by mouth 2 times daily    gabapentin (NEURONTIN) 300 MG capsule 60 capsule 5     Sig: Take 1 capsule by mouth 2 times daily for 180 days.  Intended supply: 30 days    niacin (NIASPAN) 1000 MG extended release tablet 90 tablet 1     Sig: TAKE 1 TABLET BY MOUTH DAILY ALONG WITH ASPIRIN    empagliflozin (JARDIANCE) 10 MG tablet 90 tablet 1     Sig: Take 1 tablet by mouth daily    cetirizine (ZYRTEC ALLERGY) 10 MG tablet 90 tablet 1     Sig: Take 1 tablet by mouth daily    ondansetron (ZOFRAN) 4 MG tablet 120 tablet 2     Sig: Take 1 tablet by mouth every 8 hours as needed for Nausea or Vomiting    simvastatin (ZOCOR) 80 MG tablet 90 tablet 1     Sig: Take one tablet by mouth once a day    aspirin (SM ASPIRIN ADULT LOW STRENGTH) 81 MG EC tablet 90 tablet 1     Sig: TAKE 1 TABLET BY MOUTH DAILY    albuterol sulfate  (90 Base) MCG/ACT inhaler 18 g 5     Sig: INHALE TWO PUFFS INTO THE LUNGS 4 TIMES A DAY AS NEEDED    glimepiride (AMARYL) 2 MG tablet 90 tablet 1     Sig: TAKE I/2 TABLET BY MOUTH TWICE DAILY    metFORMIN, OSM, (FORTAMET) 1000 MG extended release tablet 180 tablet 1     Sig: TAKE ONE TABLET BY MOUTH TWICE A DAY WITH FOOD    hydrOXYzine (ATARAX) 50 MG tablet 360 tablet 1     Sig: Take 1 tablet by mouth every 6 hours as needed for Itching    tamsulosin (FLOMAX) 0.4 MG capsule 90 capsule 1     Sig: Take 1 capsule by mouth daily    fenofibrate (TRICOR) 48 MG tablet 90 tablet 1     Sig: Take 1 tablet by mouth daily    Probiotic Product (ALIGN) 4 MG CAPS 30 capsule 5     Sig: Take 1 tablet by mouth daily         Electronically signed by Jhoan Agustin CNP on 2/12/2020 at 3:11 PM

## 2020-02-12 NOTE — PROGRESS NOTES
Visit Information    Have you changed or started any medications since your last visit including any over-the-counter medicines, vitamins, or herbal medicines? no   Are you having any side effects from any of your medications? -  no  Have you stopped taking any of your medications? Is so, why? -  no    Have you seen any other physician or provider since your last visit? No  Have you had any other diagnostic tests since your last visit? No  Have you been seen in the emergency room and/or had an admission to a hospital since we last saw you? No  Have you had your routine dental cleaning in the past 6 months? no    Have you activated your [employer] account? If not, what are your barriers?  Yes     Patient Care Team:  CLIF Figueroa CNP as PCP - General (Certified Nurse Practitioner)  CLIF Figueroa CNP as PCP - [facility]  Speedy Camarena MD as Consulting Physician (Gastroenterology)    Medical History Review  Past Medical, Family, and Social History reviewed and does contribute to the patient presenting condition    Health Maintenance   Topic Date Due    Diabetic foot exam  06/16/1985    Diabetic retinal exam  06/16/1985    HIV screen  06/16/1990    Hepatitis B vaccine (1 of 3 - Risk 3-dose series) 06/16/1994    Annual Wellness Visit (AWV)  05/29/2019    Diabetic microalbuminuria test  09/19/2019    Lipid screen  09/19/2019    A1C test (Diabetic or Prediabetic)  09/10/2020    Shingles Vaccine (1 of 2) 06/16/2025    DTaP/Tdap/Td vaccine (2 - Td) 09/19/2028    Flu vaccine  Completed    Pneumococcal 0-64 years Vaccine  Completed    Hepatitis A vaccine  Aged Out    Hib vaccine  Aged Out    Meningococcal (ACWY) vaccine  Aged Out

## 2020-02-14 ENCOUNTER — TELEPHONE (OUTPATIENT)
Dept: FAMILY MEDICINE CLINIC | Age: 45
End: 2020-02-14

## 2020-04-23 ENCOUNTER — TELEPHONE (OUTPATIENT)
Dept: FAMILY MEDICINE CLINIC | Age: 45
End: 2020-04-23

## 2020-04-24 ENCOUNTER — TELEPHONE (OUTPATIENT)
Dept: FAMILY MEDICINE CLINIC | Age: 45
End: 2020-04-24

## 2020-04-24 RX ORDER — METFORMIN HYDROCHLORIDE 500 MG/1
TABLET, EXTENDED RELEASE ORAL
Qty: 120 TABLET | Refills: 3 | Status: SHIPPED | OUTPATIENT
Start: 2020-04-24 | End: 2021-02-25

## 2020-04-24 NOTE — TELEPHONE ENCOUNTER
Nuria Rodriguez from [facility] is asking if Joseph Jane will change the Metformin 1000 mg   1 tab BID to:  Metformin  mg 2 tabs BID  Due to cost and NO INS    PLEASE ADVISE

## 2020-05-22 RX ORDER — OMEPRAZOLE 20 MG/1
CAPSULE, DELAYED RELEASE ORAL
Qty: 30 CAPSULE | Refills: 0 | OUTPATIENT
Start: 2020-05-22

## 2021-02-25 ENCOUNTER — APPOINTMENT (OUTPATIENT)
Dept: GENERAL RADIOLOGY | Age: 46
DRG: 637 | End: 2021-02-25
Payer: COMMERCIAL

## 2021-02-25 ENCOUNTER — HOSPITAL ENCOUNTER (INPATIENT)
Age: 46
LOS: 1 days | Discharge: HOME | DRG: 637 | End: 2021-02-26
Attending: EMERGENCY MEDICINE | Admitting: STUDENT IN AN ORGANIZED HEALTH CARE EDUCATION/TRAINING PROGRAM
Payer: COMMERCIAL

## 2021-02-25 ENCOUNTER — APPOINTMENT (OUTPATIENT)
Dept: CT IMAGING | Age: 46
DRG: 637 | End: 2021-02-25
Payer: COMMERCIAL

## 2021-02-25 DIAGNOSIS — E11.10 DIABETIC KETOACIDOSIS WITHOUT COMA ASSOCIATED WITH TYPE 2 DIABETES MELLITUS (HCC): ICD-10-CM

## 2021-02-25 DIAGNOSIS — E78.5 HYPERLIPIDEMIA, UNSPECIFIED HYPERLIPIDEMIA TYPE: ICD-10-CM

## 2021-02-25 DIAGNOSIS — Z76.0 MEDICATION REFILL: ICD-10-CM

## 2021-02-25 DIAGNOSIS — E11.42 TYPE 2 DIABETES MELLITUS WITH DIABETIC POLYNEUROPATHY, WITHOUT LONG-TERM CURRENT USE OF INSULIN (HCC): ICD-10-CM

## 2021-02-25 DIAGNOSIS — K85.00 IDIOPATHIC ACUTE PANCREATITIS WITHOUT INFECTION OR NECROSIS: Primary | ICD-10-CM

## 2021-02-25 PROBLEM — Z91.14 NONCOMPLIANCE WITH MEDICATION REGIMEN: Status: ACTIVE | Noted: 2021-02-25

## 2021-02-25 PROBLEM — K85.90 ACUTE PANCREATITIS: Status: ACTIVE | Noted: 2021-02-25

## 2021-02-25 PROBLEM — Z91.148 NONCOMPLIANCE WITH MEDICATION REGIMEN: Status: ACTIVE | Noted: 2021-02-25

## 2021-02-25 LAB
ABSOLUTE EOS #: 0.16 K/UL (ref 0–0.4)
ABSOLUTE IMMATURE GRANULOCYTE: ABNORMAL K/UL (ref 0–0.3)
ABSOLUTE LYMPH #: 2.3 K/UL (ref 1–4.8)
ABSOLUTE MONO #: 0.41 K/UL (ref 0.1–1.3)
ALBUMIN SERPL-MCNC: 3.7 G/DL (ref 3.5–5.2)
ALBUMIN/GLOBULIN RATIO: ABNORMAL (ref 1–2.5)
ALLEN TEST: ABNORMAL
ALP BLD-CCNC: 88 U/L (ref 40–129)
ALT SERPL-CCNC: 16 U/L (ref 5–41)
ANION GAP SERPL CALCULATED.3IONS-SCNC: 14 MMOL/L (ref 9–17)
ANION GAP SERPL CALCULATED.3IONS-SCNC: 21 MMOL/L (ref 9–17)
AST SERPL-CCNC: 39 U/L
BASOPHILS # BLD: 1 % (ref 0–2)
BASOPHILS ABSOLUTE: 0.08 K/UL (ref 0–0.2)
BETA-HYDROXYBUTYRATE: 4.31 MMOL/L (ref 0.02–0.27)
BILIRUB SERPL-MCNC: 0.46 MG/DL (ref 0.3–1.2)
BUN BLDV-MCNC: 8 MG/DL (ref 6–20)
BUN BLDV-MCNC: 8 MG/DL (ref 6–20)
BUN/CREAT BLD: ABNORMAL (ref 9–20)
BUN/CREAT BLD: ABNORMAL (ref 9–20)
CALCIUM SERPL-MCNC: 9.6 MG/DL (ref 8.6–10.4)
CALCIUM SERPL-MCNC: 9.8 MG/DL (ref 8.6–10.4)
CARBOXYHEMOGLOBIN: 3.5 % (ref 0–5)
CHLORIDE BLD-SCNC: 101 MMOL/L (ref 98–107)
CHLORIDE BLD-SCNC: 104 MMOL/L (ref 98–107)
CO2: 14 MMOL/L (ref 20–31)
CO2: 21 MMOL/L (ref 20–31)
CREAT SERPL-MCNC: 0.79 MG/DL (ref 0.7–1.2)
CREAT SERPL-MCNC: <0.4 MG/DL (ref 0.7–1.2)
DIFFERENTIAL TYPE: ABNORMAL
EOSINOPHILS RELATIVE PERCENT: 2 % (ref 0–4)
FIO2: ABNORMAL
GFR AFRICAN AMERICAN: >60 ML/MIN
GFR AFRICAN AMERICAN: ABNORMAL ML/MIN
GFR NON-AFRICAN AMERICAN: >60 ML/MIN
GFR NON-AFRICAN AMERICAN: ABNORMAL ML/MIN
GFR SERPL CREATININE-BSD FRML MDRD: ABNORMAL ML/MIN/{1.73_M2}
GLUCOSE BLD-MCNC: 139 MG/DL (ref 70–99)
GLUCOSE BLD-MCNC: 155 MG/DL (ref 75–110)
GLUCOSE BLD-MCNC: 161 MG/DL (ref 75–110)
GLUCOSE BLD-MCNC: 183 MG/DL (ref 75–110)
GLUCOSE BLD-MCNC: 234 MG/DL (ref 75–110)
GLUCOSE BLD-MCNC: 275 MG/DL (ref 70–99)
GLUCOSE BLD-MCNC: 345 MG/DL (ref 75–110)
HCO3 VENOUS: 17.5 MMOL/L (ref 24–30)
HCT VFR BLD CALC: 47.9 % (ref 41–53)
HEMOGLOBIN: 16.1 G/DL (ref 13.5–17.5)
IMMATURE GRANULOCYTES: ABNORMAL %
LIPASE: 126 U/L (ref 13–60)
LYMPHOCYTES # BLD: 28 % (ref 24–44)
MAGNESIUM: 1.9 MG/DL (ref 1.6–2.6)
MAGNESIUM: 2 MG/DL (ref 1.6–2.6)
MCH RBC QN AUTO: 29 PG (ref 26–34)
MCHC RBC AUTO-ENTMCNC: 33.6 G/DL (ref 31–37)
MCV RBC AUTO: 86.3 FL (ref 80–100)
METHEMOGLOBIN: 0.6 % (ref 0–1.9)
MODE: ABNORMAL
MONOCYTES # BLD: 5 % (ref 1–7)
MORPHOLOGY: ABNORMAL
NEGATIVE BASE EXCESS, VEN: 7.6 MMOL/L (ref 0–2)
NOTIFICATION TIME: ABNORMAL
NOTIFICATION: ABNORMAL
NRBC AUTOMATED: ABNORMAL PER 100 WBC
O2 DEVICE/FLOW/%: ABNORMAL
O2 SAT, VEN: 92.7 % (ref 60–85)
OXYHEMOGLOBIN: ABNORMAL % (ref 95–98)
PATIENT TEMP: 37
PCO2, VEN, TEMP ADJ: ABNORMAL MMHG (ref 39–55)
PCO2, VEN: 29.3 (ref 39–55)
PDW BLD-RTO: 15.8 % (ref 11.5–14.9)
PEEP/CPAP: ABNORMAL
PH VENOUS: 7.38 (ref 7.32–7.42)
PH, VEN, TEMP ADJ: ABNORMAL (ref 7.32–7.42)
PHOSPHORUS: 3.2 MG/DL (ref 2.5–4.5)
PLATELET # BLD: 262 K/UL (ref 150–450)
PLATELET ESTIMATE: ABNORMAL
PMV BLD AUTO: 8.6 FL (ref 6–12)
PO2, VEN, TEMP ADJ: ABNORMAL MMHG (ref 30–50)
PO2, VEN: 77.9 (ref 30–50)
POSITIVE BASE EXCESS, VEN: ABNORMAL MMOL/L (ref 0–2)
POTASSIUM SERPL-SCNC: 3.8 MMOL/L (ref 3.7–5.3)
POTASSIUM SERPL-SCNC: 5.4 MMOL/L (ref 3.7–5.3)
PSV: ABNORMAL
PT. POSITION: ABNORMAL
RBC # BLD: 5.55 M/UL (ref 4.5–5.9)
RBC # BLD: ABNORMAL 10*6/UL
RESPIRATORY RATE: ABNORMAL
SAMPLE SITE: ABNORMAL
SEG NEUTROPHILS: 64 % (ref 36–66)
SEGMENTED NEUTROPHILS ABSOLUTE COUNT: 5.25 K/UL (ref 1.3–9.1)
SET RATE: ABNORMAL
SODIUM BLD-SCNC: 136 MMOL/L (ref 135–144)
SODIUM BLD-SCNC: 139 MMOL/L (ref 135–144)
TEXT FOR RESPIRATORY: ABNORMAL
TOTAL HB: ABNORMAL G/DL (ref 12–16)
TOTAL PROTEIN: 7.9 G/DL (ref 6.4–8.3)
TOTAL RATE: ABNORMAL
VT: ABNORMAL
WBC # BLD: 8.2 K/UL (ref 3.5–11)
WBC # BLD: ABNORMAL 10*3/UL

## 2021-02-25 PROCEDURE — 82805 BLOOD GASES W/O2 SATURATION: CPT

## 2021-02-25 PROCEDURE — 80053 COMPREHEN METABOLIC PANEL: CPT

## 2021-02-25 PROCEDURE — 82010 KETONE BODYS QUAN: CPT

## 2021-02-25 PROCEDURE — 96360 HYDRATION IV INFUSION INIT: CPT

## 2021-02-25 PROCEDURE — 6360000002 HC RX W HCPCS: Performed by: INTERNAL MEDICINE

## 2021-02-25 PROCEDURE — 82947 ASSAY GLUCOSE BLOOD QUANT: CPT

## 2021-02-25 PROCEDURE — 83735 ASSAY OF MAGNESIUM: CPT

## 2021-02-25 PROCEDURE — 2000000000 HC ICU R&B

## 2021-02-25 PROCEDURE — 99283 EMERGENCY DEPT VISIT LOW MDM: CPT

## 2021-02-25 PROCEDURE — 2580000003 HC RX 258: Performed by: NURSE PRACTITIONER

## 2021-02-25 PROCEDURE — 85025 COMPLETE CBC W/AUTO DIFF WBC: CPT

## 2021-02-25 PROCEDURE — 80048 BASIC METABOLIC PNL TOTAL CA: CPT

## 2021-02-25 PROCEDURE — 2060000000 HC ICU INTERMEDIATE R&B

## 2021-02-25 PROCEDURE — 6370000000 HC RX 637 (ALT 250 FOR IP): Performed by: EMERGENCY MEDICINE

## 2021-02-25 PROCEDURE — 84100 ASSAY OF PHOSPHORUS: CPT

## 2021-02-25 PROCEDURE — 74176 CT ABD & PELVIS W/O CONTRAST: CPT

## 2021-02-25 PROCEDURE — 36415 COLL VENOUS BLD VENIPUNCTURE: CPT

## 2021-02-25 PROCEDURE — 83690 ASSAY OF LIPASE: CPT

## 2021-02-25 PROCEDURE — 96361 HYDRATE IV INFUSION ADD-ON: CPT

## 2021-02-25 PROCEDURE — 2580000003 HC RX 258: Performed by: EMERGENCY MEDICINE

## 2021-02-25 PROCEDURE — 71045 X-RAY EXAM CHEST 1 VIEW: CPT

## 2021-02-25 RX ORDER — 0.9 % SODIUM CHLORIDE 0.9 %
15 INTRAVENOUS SOLUTION INTRAVENOUS ONCE
Status: DISCONTINUED | OUTPATIENT
Start: 2021-02-25 | End: 2021-02-25

## 2021-02-25 RX ORDER — SODIUM CHLORIDE 0.9 % (FLUSH) 0.9 %
10 SYRINGE (ML) INJECTION PRN
Status: DISCONTINUED | OUTPATIENT
Start: 2021-02-25 | End: 2021-02-26 | Stop reason: HOSPADM

## 2021-02-25 RX ORDER — ONDANSETRON 2 MG/ML
4 INJECTION INTRAMUSCULAR; INTRAVENOUS EVERY 6 HOURS PRN
Status: DISCONTINUED | OUTPATIENT
Start: 2021-02-25 | End: 2021-02-26 | Stop reason: HOSPADM

## 2021-02-25 RX ORDER — ACETAMINOPHEN 325 MG/1
650 TABLET ORAL EVERY 6 HOURS PRN
Status: DISCONTINUED | OUTPATIENT
Start: 2021-02-25 | End: 2021-02-26 | Stop reason: HOSPADM

## 2021-02-25 RX ORDER — SODIUM CHLORIDE 0.9 % (FLUSH) 0.9 %
10 SYRINGE (ML) INJECTION EVERY 12 HOURS SCHEDULED
Status: DISCONTINUED | OUTPATIENT
Start: 2021-02-25 | End: 2021-02-26 | Stop reason: HOSPADM

## 2021-02-25 RX ORDER — MORPHINE SULFATE 4 MG/ML
4 INJECTION, SOLUTION INTRAMUSCULAR; INTRAVENOUS EVERY 4 HOURS PRN
Status: DISCONTINUED | OUTPATIENT
Start: 2021-02-25 | End: 2021-02-26 | Stop reason: HOSPADM

## 2021-02-25 RX ORDER — MORPHINE SULFATE 2 MG/ML
2 INJECTION, SOLUTION INTRAMUSCULAR; INTRAVENOUS EVERY 4 HOURS PRN
Status: DISCONTINUED | OUTPATIENT
Start: 2021-02-25 | End: 2021-02-26 | Stop reason: HOSPADM

## 2021-02-25 RX ORDER — PROMETHAZINE HYDROCHLORIDE 25 MG/1
12.5 TABLET ORAL EVERY 6 HOURS PRN
Status: DISCONTINUED | OUTPATIENT
Start: 2021-02-25 | End: 2021-02-26 | Stop reason: HOSPADM

## 2021-02-25 RX ORDER — SODIUM CHLORIDE 9 MG/ML
INJECTION, SOLUTION INTRAVENOUS CONTINUOUS
Status: DISCONTINUED | OUTPATIENT
Start: 2021-02-25 | End: 2021-02-26

## 2021-02-25 RX ORDER — DEXTROSE MONOHYDRATE 25 G/50ML
12.5 INJECTION, SOLUTION INTRAVENOUS PRN
Status: DISCONTINUED | OUTPATIENT
Start: 2021-02-25 | End: 2021-02-26 | Stop reason: HOSPADM

## 2021-02-25 RX ORDER — ACETAMINOPHEN 650 MG/1
650 SUPPOSITORY RECTAL EVERY 6 HOURS PRN
Status: DISCONTINUED | OUTPATIENT
Start: 2021-02-25 | End: 2021-02-26 | Stop reason: HOSPADM

## 2021-02-25 RX ORDER — DEXTROSE AND SODIUM CHLORIDE 5; .45 G/100ML; G/100ML
INJECTION, SOLUTION INTRAVENOUS CONTINUOUS PRN
Status: DISCONTINUED | OUTPATIENT
Start: 2021-02-25 | End: 2021-02-26 | Stop reason: HOSPADM

## 2021-02-25 RX ORDER — SODIUM CHLORIDE 9 MG/ML
INJECTION, SOLUTION INTRAVENOUS CONTINUOUS
Status: DISCONTINUED | OUTPATIENT
Start: 2021-02-26 | End: 2021-02-25

## 2021-02-25 RX ORDER — NICOTINE POLACRILEX 4 MG
15 LOZENGE BUCCAL PRN
Status: DISCONTINUED | OUTPATIENT
Start: 2021-02-25 | End: 2021-02-26 | Stop reason: HOSPADM

## 2021-02-25 RX ORDER — 0.9 % SODIUM CHLORIDE 0.9 %
1000 INTRAVENOUS SOLUTION INTRAVENOUS ONCE
Status: COMPLETED | OUTPATIENT
Start: 2021-02-25 | End: 2021-02-25

## 2021-02-25 RX ORDER — POLYETHYLENE GLYCOL 3350 17 G/17G
17 POWDER, FOR SOLUTION ORAL DAILY PRN
Status: DISCONTINUED | OUTPATIENT
Start: 2021-02-25 | End: 2021-02-26 | Stop reason: HOSPADM

## 2021-02-25 RX ORDER — DEXTROSE MONOHYDRATE 50 MG/ML
100 INJECTION, SOLUTION INTRAVENOUS PRN
Status: DISCONTINUED | OUTPATIENT
Start: 2021-02-25 | End: 2021-02-26 | Stop reason: HOSPADM

## 2021-02-25 RX ADMIN — DEXTROSE AND SODIUM CHLORIDE: 5; 450 INJECTION, SOLUTION INTRAVENOUS at 23:39

## 2021-02-25 RX ADMIN — SODIUM CHLORIDE 5.22 UNITS/HR: 9 INJECTION, SOLUTION INTRAVENOUS at 19:46

## 2021-02-25 RX ADMIN — SODIUM CHLORIDE 1000 ML: 9 INJECTION, SOLUTION INTRAVENOUS at 14:52

## 2021-02-25 RX ADMIN — ENOXAPARIN SODIUM 30 MG: 30 INJECTION SUBCUTANEOUS at 23:48

## 2021-02-25 ASSESSMENT — PAIN DESCRIPTION - PAIN TYPE
TYPE: ACUTE PAIN
TYPE: ACUTE PAIN

## 2021-02-25 ASSESSMENT — ENCOUNTER SYMPTOMS
COUGH: 0
DIARRHEA: 0
TROUBLE SWALLOWING: 0
SORE THROAT: 0
SHORTNESS OF BREATH: 1
COLOR CHANGE: 0
BLOOD IN STOOL: 0
BACK PAIN: 0
CONSTIPATION: 1
NAUSEA: 0
ABDOMINAL PAIN: 1
VOMITING: 0

## 2021-02-25 ASSESSMENT — PAIN DESCRIPTION - ORIENTATION
ORIENTATION: RIGHT
ORIENTATION: RIGHT

## 2021-02-25 ASSESSMENT — PAIN DESCRIPTION - FREQUENCY: FREQUENCY: CONTINUOUS

## 2021-02-25 ASSESSMENT — PAIN DESCRIPTION - LOCATION: LOCATION: FLANK

## 2021-02-25 ASSESSMENT — PAIN DESCRIPTION - DESCRIPTORS: DESCRIPTORS: DISCOMFORT;DULL

## 2021-02-25 ASSESSMENT — PAIN - FUNCTIONAL ASSESSMENT: PAIN_FUNCTIONAL_ASSESSMENT: ACTIVITIES ARE NOT PREVENTED

## 2021-02-25 ASSESSMENT — PAIN DESCRIPTION - ONSET: ONSET: ON-GOING

## 2021-02-25 ASSESSMENT — PAIN DESCRIPTION - PROGRESSION: CLINICAL_PROGRESSION: NOT CHANGED

## 2021-02-25 NOTE — ED PROVIDER NOTES
[address] [facility]  EMERGENCY DEPARTMENT ENCOUNTER    Pt Name: Scott Lyons  MRN: [id number]  YOB: 1975  Date of evaluation:2/25/21  PCP: CLIF Mckay CNP    CHIEF COMPLAINT       Chief Complaint   Patient presents with    Flank Pain       HISTORY OF PRESENT ILLNESS    Scott Lyons is a 39 y.o. male who presents with a chief complaint of right flank pain. Patient states he had pain in his right flank that wraps around his side to the right side of his abdomen since last Wednesday over 1 week ago. There is no falls or trauma. Pain is sharp. Nothing really makes it better or worse. States he is also been having issues with constipation but he has IBS so he does frequently get constipation so this is not necessarily new. No nausea or vomiting. No chest pain but he is having some difficulty breathing. No cough. No recent fevers or chills. No urinary symptoms. Patient is also concerned because his blood sugar has been higher than normal as well. He is a type II diabetic. He admits to being off of his medications for the past several months secondary to not going to see his doctor anymore with the COVID-19 pandemic. He has never been in DKA before. He states he went to urgent care today and was sent here. Symptoms are acute. Symptoms are moderate. Nothing really makes symptoms better or worse. Patient has no other complaints at this time. REVIEW OF SYSTEMS       Review of Systems   Constitutional: Negative for chills, fatigue and fever. HENT: Negative for congestion, ear pain, sore throat and trouble swallowing. Eyes: Negative for visual disturbance. Respiratory: Positive for shortness of breath. Negative for cough. Cardiovascular: Negative for chest pain, palpitations and leg swelling. Gastrointestinal: Positive for abdominal pain and constipation. Negative for blood in stool, diarrhea, nausea and vomiting. Genitourinary: Positive for flank pain. Negative for dysuria. Musculoskeletal: Negative for arthralgias, back pain, myalgias and neck pain. Skin: Negative for color change, rash and wound. Neurological: Negative for dizziness, weakness, light-headedness, numbness and headaches. Psychiatric/Behavioral: Negative for confusion. The patient is nervous/anxious. All other systems reviewed and are negative. Negative in 10 essential Systems except as mentioned above and in the HPI. PAST MEDICAL HISTORY     Past Medical History:   Diagnosis Date    ADHD (attention deficit hyperactivity disorder)     Anxiety     Bipolar 2 disorder (HCC)     BPH (benign prostatic hyperplasia)     Carpal tunnel syndrome     Gout     Hyperlipidemia     Type 2 diabetes mellitus without complication ([facility] [address]          SURGICAL HISTORY      has a past surgical history that includes Appendectomy. CURRENT MEDICATIONS       Previous Medications    ALBUTEROL SULFATE  (90 BASE) MCG/ACT INHALER    INHALE TWO PUFFS INTO THE LUNGS 4 TIMES A DAY AS NEEDED    ASPIRIN (SM ASPIRIN ADULT LOW STRENGTH) 81 MG EC TABLET    TAKE 1 TABLET BY MOUTH DAILY    BLOOD GLUCOSE MONITORING SUPPL (TRUE METRIX METER) W/DEVICE KIT    USE TO TEST BLOOD SUGAR TWICE A DAY AND AS NEEDED    CETIRIZINE (ZYRTEC ALLERGY) 10 MG TABLET    Take 1 tablet by mouth daily    EMPAGLIFLOZIN (JARDIANCE) 10 MG TABLET    Take 1 tablet by mouth daily    FENOFIBRATE (TRICOR) 48 MG TABLET    Take 1 tablet by mouth daily    GABAPENTIN (NEURONTIN) 300 MG CAPSULE    Take 1 capsule by mouth 2 times daily for 180 days.  Intended supply: 30 days    GLIMEPIRIDE (AMARYL) 2 MG TABLET    TAKE I/2 TABLET BY MOUTH TWICE DAILY    HYDROXYZINE (ATARAX) 50 MG TABLET    Take 1 tablet by mouth every 6 hours as needed for Itching    ICOSAPENT ETHYL (VASCEPA) 1 G CAPS CAPSULE    Take 1 capsule by mouth 2 times daily    LANCET DEVICES ([employer] LANCING DEV) MISC    USE TO TEST BLOOD SUGAR TWICE A DAY AND AS NEEDED METFORMIN, OSM, (GLUCOPHAGE-XR) 500 MG EXTENDED RELEASE TABLET    TAKE TWO TABLETS BY MOUTH TWICE A DAY WITH FOOD    NIACIN (NIASPAN) 1000 MG EXTENDED RELEASE TABLET    TAKE 1 TABLET BY MOUTH DAILY ALONG WITH ASPIRIN    ONDANSETRON (ZOFRAN) 4 MG TABLET    Take 1 tablet by mouth every 8 hours as needed for Nausea or Vomiting    PHARMACIST CHOICE ALCOHOL 70 % PADS    USE TO USE TO TEST BLOOD SUGAR TWICE A DAY AND AS NEEDED    PHARMACIST CHOICE LANCETS MISC    USE TO TEST BLOOD SUGAR TWICE A DAY AND AS NEEDED    PROBIOTIC PRODUCT (ALIGN) 4 MG CAPS    Take 1 tablet by mouth daily    SIMVASTATIN (ZOCOR) 80 MG TABLET    Take one tablet by mouth once a day    TAMSULOSIN (FLOMAX) 0.4 MG CAPSULE    Take 1 capsule by mouth daily    TRUE METRIX BLOOD GLUCOSE TEST STRIP    USE TO TEST BLOOD SUGAR TWICE A DAY AND AS NEEDED       ALLERGIES     is allergic to latex. FAMILY HISTORY     He indicated that his mother is alive. He indicated that his father is . He indicated that his maternal grandmother is . He indicated that his maternal grandfather is . He indicated that his paternal grandmother is . He indicated that his paternal grandfather is . family history includes Cancer in his mother; Colon Cancer in his father; Diabetes in his father and paternal grandmother; Stroke in his maternal grandfather and paternal grandmother. SOCIAL HISTORY      reports that he has never smoked. He has never used smokeless tobacco. He reports current drug use. Drug: Marijuana. He reports that he does not drink alcohol. PHYSICAL EXAM     INITIAL VITALS:  oral temperature is 97.7 °F (36.5 °C). His blood pressure is 147/119 (abnormal) and his pulse is 112. His respiration is 18 and oxygen saturation is 98%. Physical Exam  Vitals signs and nursing note reviewed. Constitutional:       General: He is not in acute distress. HENT:      Head: Normocephalic and atraumatic.    Eyes: Conjunctiva/sclera: Conjunctivae normal.      Pupils: Pupils are equal, round, and reactive to light. Neck:      Musculoskeletal: Neck supple. Cardiovascular:      Rate and Rhythm: Regular rhythm. Tachycardia present. Heart sounds: Normal heart sounds. No murmur. Pulmonary:      Effort: Pulmonary effort is normal. No respiratory distress. Breath sounds: Normal breath sounds. Abdominal:      General: Bowel sounds are normal. There is no distension. Palpations: Abdomen is soft. Tenderness: There is no abdominal tenderness. There is no right CVA tenderness or left CVA tenderness. Musculoskeletal:         General: No tenderness. Lymphadenopathy:      Cervical: No cervical adenopathy. Skin:     General: Skin is warm and dry. Findings: No rash. Neurological:      Mental Status: He is alert and oriented to person, place, and time. Psychiatric:         Judgment: Judgment normal.           DIFFERENTIAL DIAGNOSIS/MDM:   51-year-old male type II diabetic on oral medications admitting to be noncompliant presents with 1 week of flank pain, difficulty breathing and elevated blood sugar. Currently he is afebrile, nontoxic, hypertensive as well as mildly tachycardic. Not any acute distress. Differential diagnosis is broad includes DKA, hyperglycemia, metabolic abnormality, kidney stone, UTI, less likely sepsis. We will get broad work-up including blood work, electrolytes, urinalysis, CT abdomen pelvis. I am going to get a chest x-ray however I really have a lower suspicion for pneumonia at this time. Most likely his difficulty breathing is secondary to pain.     DIAGNOSTIC RESULTS     EKG: All EKG's are interpreted by the Emergency Department Physician who either signs or Co-signs this chart in the absence of a cardiologist.        RADIOLOGY:   I directly visualized the following  images and reviewed the radiologist interpretations:  CT ABDOMEN PELVIS WO CONTRAST   Final Result 1. Peripancreatic stranding and fluid, consistent with acute pancreatitis. 2. Hepatic steatosis. 3. Splenomegaly. XR CHEST PORTABLE   Final Result   Negative portable chest exam                 ED BEDSIDE ULTRASOUND:      LABS:  Labs Reviewed   CBC WITH AUTO DIFFERENTIAL - Abnormal; Notable for the following components:       Result Value    RDW 15.8 (*)     All other components within normal limits   BLOOD GAS, VENOUS - Abnormal; Notable for the following components:    pCO2, Christiano 29.3 (*)     pO2, Christiano 77.9 (*)     HCO3, Venous 17.5 (*)     Negative Base Excess, Christiano 7.6 (*)     O2 Sat, Christiano 92.7 (*)     All other components within normal limits   BETA-HYDROXYBUTYRATE - Abnormal; Notable for the following components:    Beta-Hydroxybutyrate 4.31 (*)     All other components within normal limits   COMPREHENSIVE METABOLIC PANEL - Abnormal; Notable for the following components:    Glucose 275 (*)     Potassium 5.4 (*)     CO2 14 (*)     Anion Gap 21 (*)     All other components within normal limits   LIPASE - Abnormal; Notable for the following components:    Lipase 126 (*)     All other components within normal limits   POC GLUCOSE FINGERSTICK - Abnormal; Notable for the following components:    POC Glucose 345 (*)     All other components within normal limits   MAGNESIUM   URINE RT REFLEX TO CULTURE   POCT GLUCOSE   POCT GLUCOSE   POCT GLUCOSE   POCT GLUCOSE   POCT GLUCOSE   POCT GLUCOSE   POCT GLUCOSE   POCT GLUCOSE   POCT GLUCOSE   POCT GLUCOSE   POCT GLUCOSE   POCT GLUCOSE   POCT GLUCOSE         EMERGENCY DEPARTMENT COURSE:   Vitals:    Vitals:    02/25/21 1425   BP: (!) 147/119   Pulse: 112   Resp: 18   Temp: 97.7 °F (36.5 °C)   TempSrc: Oral   SpO2: 98%     6:41 PM EST  Patient found to have acute pancreatitis on CT scan. Lipase is mildly elevated at 126. Lab work confirms probable early DKA. His bicarb is 14. He has an anion gap over 20. Serum ketones are positive.   Blood glucose is 275.    We will start on insulin infusion. His potassium is borderline high. Has received 1 L of IV fluid so far. I spoke with Dr. Ponce Solorzano who excepted patient for admission. CRITICALCARE:  CRITICAL CARE: There was a high probability of clinically significant/life threatening deterioration in this patient's condition which required my urgent intervention. Total critical care time was 35 minutes. This excludes any time for separately reportable procedures. CONSULTS:  IP CONSULT TO INTERNAL MEDICINE      PROCEDURES:      FINAL IMPRESSION      1. Idiopathic acute pancreatitis without infection or necrosis    2. Diabetic ketoacidosis without coma associated with type 2 diabetes mellitus ([facility] [address]            DISPOSITION/PLAN   DISPOSITION Decision To Admit 02/25/2021 06:16:34 PM          PATIENT REFERRED TO:  No follow-up provider specified.     DISCHARGE MEDICATIONS:  New Prescriptions    No medications on file       (Please note that portions ofthis note were completed with a voice recognition program.  Efforts were made to edit the dictations but occasionally words are mis-transcribed.)    Martha Carrasco DO  Attending Emergency Physician          Martha Carrasco DO  02/25/21 [id number]

## 2021-02-25 NOTE — ED NOTES
POC glucose 345mg/dL at 1436. Physician, and nurse notified.      Loy Angelucci Fredrick  02/25/21 1438

## 2021-02-26 VITALS
SYSTOLIC BLOOD PRESSURE: 157 MMHG | TEMPERATURE: 98.5 F | HEART RATE: 88 BPM | WEIGHT: 223.7 LBS | BODY MASS INDEX: 33.13 KG/M2 | DIASTOLIC BLOOD PRESSURE: 90 MMHG | RESPIRATION RATE: 19 BRPM | OXYGEN SATURATION: 97 % | HEIGHT: 69 IN

## 2021-02-26 LAB
ANION GAP SERPL CALCULATED.3IONS-SCNC: 11 MMOL/L (ref 9–17)
ANION GAP SERPL CALCULATED.3IONS-SCNC: 15 MMOL/L (ref 9–17)
ANION GAP SERPL CALCULATED.3IONS-SCNC: 9 MMOL/L (ref 9–17)
BUN BLDV-MCNC: 8 MG/DL (ref 6–20)
BUN BLDV-MCNC: 9 MG/DL (ref 6–20)
BUN BLDV-MCNC: 9 MG/DL (ref 6–20)
BUN/CREAT BLD: ABNORMAL (ref 9–20)
CALCIUM SERPL-MCNC: 9.4 MG/DL (ref 8.6–10.4)
CALCIUM SERPL-MCNC: 9.4 MG/DL (ref 8.6–10.4)
CALCIUM SERPL-MCNC: 9.6 MG/DL (ref 8.6–10.4)
CHLORIDE BLD-SCNC: 102 MMOL/L (ref 98–107)
CHLORIDE BLD-SCNC: 103 MMOL/L (ref 98–107)
CHLORIDE BLD-SCNC: 103 MMOL/L (ref 98–107)
CHOLESTEROL/HDL RATIO: 10.7
CHOLESTEROL: 224 MG/DL
CO2: 20 MMOL/L (ref 20–31)
CO2: 24 MMOL/L (ref 20–31)
CO2: 25 MMOL/L (ref 20–31)
CREAT SERPL-MCNC: 0.58 MG/DL (ref 0.7–1.2)
CREAT SERPL-MCNC: 0.59 MG/DL (ref 0.7–1.2)
CREAT SERPL-MCNC: 0.63 MG/DL (ref 0.7–1.2)
ESTIMATED AVERAGE GLUCOSE: 346 MG/DL
GFR AFRICAN AMERICAN: >60 ML/MIN
GFR NON-AFRICAN AMERICAN: >60 ML/MIN
GFR SERPL CREATININE-BSD FRML MDRD: ABNORMAL ML/MIN/{1.73_M2}
GLUCOSE BLD-MCNC: 105 MG/DL (ref 75–110)
GLUCOSE BLD-MCNC: 127 MG/DL (ref 70–99)
GLUCOSE BLD-MCNC: 137 MG/DL (ref 75–110)
GLUCOSE BLD-MCNC: 142 MG/DL (ref 75–110)
GLUCOSE BLD-MCNC: 150 MG/DL (ref 75–110)
GLUCOSE BLD-MCNC: 154 MG/DL (ref 75–110)
GLUCOSE BLD-MCNC: 156 MG/DL (ref 75–110)
GLUCOSE BLD-MCNC: 167 MG/DL (ref 75–110)
GLUCOSE BLD-MCNC: 172 MG/DL (ref 70–99)
GLUCOSE BLD-MCNC: 173 MG/DL (ref 75–110)
GLUCOSE BLD-MCNC: 174 MG/DL (ref 70–99)
GLUCOSE BLD-MCNC: 187 MG/DL (ref 75–110)
GLUCOSE BLD-MCNC: 187 MG/DL (ref 75–110)
GLUCOSE BLD-MCNC: 192 MG/DL (ref 75–110)
GLUCOSE BLD-MCNC: 201 MG/DL (ref 75–110)
GLUCOSE BLD-MCNC: 224 MG/DL (ref 75–110)
GLUCOSE BLD-MCNC: 237 MG/DL (ref 75–110)
HBA1C MFR BLD: 13.7 % (ref 4–6)
HCT VFR BLD CALC: 42.7 % (ref 41–53)
HDLC SERPL-MCNC: 21 MG/DL
HEMOGLOBIN: 14.7 G/DL (ref 13.5–17.5)
LDL CHOLESTEROL DIRECT: 50 MG/DL
LDL CHOLESTEROL: ABNORMAL MG/DL (ref 0–130)
MAGNESIUM: 1.7 MG/DL (ref 1.6–2.6)
MAGNESIUM: 1.8 MG/DL (ref 1.6–2.6)
MAGNESIUM: 1.9 MG/DL (ref 1.6–2.6)
MCH RBC QN AUTO: 29.5 PG (ref 26–34)
MCHC RBC AUTO-ENTMCNC: 34.4 G/DL (ref 31–37)
MCV RBC AUTO: 85.6 FL (ref 80–100)
NRBC AUTOMATED: ABNORMAL PER 100 WBC
PDW BLD-RTO: 15.5 % (ref 11.5–14.9)
PHOSPHORUS: 3.1 MG/DL (ref 2.5–4.5)
PHOSPHORUS: 3.4 MG/DL (ref 2.5–4.5)
PHOSPHORUS: 3.5 MG/DL (ref 2.5–4.5)
PLATELET # BLD: 156 K/UL (ref 150–450)
PMV BLD AUTO: 8.1 FL (ref 6–12)
POTASSIUM SERPL-SCNC: 3.5 MMOL/L (ref 3.7–5.3)
POTASSIUM SERPL-SCNC: 3.8 MMOL/L (ref 3.7–5.3)
POTASSIUM SERPL-SCNC: 3.8 MMOL/L (ref 3.7–5.3)
RBC # BLD: 4.99 M/UL (ref 4.5–5.9)
SODIUM BLD-SCNC: 136 MMOL/L (ref 135–144)
SODIUM BLD-SCNC: 138 MMOL/L (ref 135–144)
SODIUM BLD-SCNC: 138 MMOL/L (ref 135–144)
TRIGL SERPL-MCNC: 787 MG/DL
VLDLC SERPL CALC-MCNC: ABNORMAL MG/DL (ref 1–30)
WBC # BLD: 5.9 K/UL (ref 3.5–11)

## 2021-02-26 PROCEDURE — 36415 COLL VENOUS BLD VENIPUNCTURE: CPT

## 2021-02-26 PROCEDURE — 94761 N-INVAS EAR/PLS OXIMETRY MLT: CPT

## 2021-02-26 PROCEDURE — 2580000003 HC RX 258: Performed by: STUDENT IN AN ORGANIZED HEALTH CARE EDUCATION/TRAINING PROGRAM

## 2021-02-26 PROCEDURE — 83036 HEMOGLOBIN GLYCOSYLATED A1C: CPT

## 2021-02-26 PROCEDURE — 83735 ASSAY OF MAGNESIUM: CPT

## 2021-02-26 PROCEDURE — 2580000003 HC RX 258: Performed by: NURSE PRACTITIONER

## 2021-02-26 PROCEDURE — 6370000000 HC RX 637 (ALT 250 FOR IP): Performed by: STUDENT IN AN ORGANIZED HEALTH CARE EDUCATION/TRAINING PROGRAM

## 2021-02-26 PROCEDURE — 99291 CRITICAL CARE FIRST HOUR: CPT | Performed by: INTERNAL MEDICINE

## 2021-02-26 PROCEDURE — 80061 LIPID PANEL: CPT

## 2021-02-26 PROCEDURE — 83721 ASSAY OF BLOOD LIPOPROTEIN: CPT

## 2021-02-26 PROCEDURE — 82947 ASSAY GLUCOSE BLOOD QUANT: CPT

## 2021-02-26 PROCEDURE — 85027 COMPLETE CBC AUTOMATED: CPT

## 2021-02-26 PROCEDURE — 80048 BASIC METABOLIC PNL TOTAL CA: CPT

## 2021-02-26 PROCEDURE — 6360000002 HC RX W HCPCS: Performed by: INTERNAL MEDICINE

## 2021-02-26 PROCEDURE — 84100 ASSAY OF PHOSPHORUS: CPT

## 2021-02-26 RX ORDER — ASPIRIN 81 MG/1
TABLET ORAL
Qty: 90 TABLET | Refills: 1 | Status: SHIPPED | OUTPATIENT
Start: 2021-02-26 | End: 2021-06-23 | Stop reason: SDUPTHER

## 2021-02-26 RX ORDER — INSULIN GLARGINE 100 [IU]/ML
25 INJECTION, SOLUTION SUBCUTANEOUS ONCE
Status: COMPLETED | OUTPATIENT
Start: 2021-02-26 | End: 2021-02-26

## 2021-02-26 RX ORDER — METFORMIN HYDROCHLORIDE 500 MG/1
TABLET, EXTENDED RELEASE ORAL
Qty: 120 TABLET | Refills: 1 | Status: SHIPPED | OUTPATIENT
Start: 2021-02-26 | End: 2021-06-23 | Stop reason: SDUPTHER

## 2021-02-26 RX ORDER — FENOFIBRATE 48 MG/1
48 TABLET, COATED ORAL DAILY
Qty: 90 TABLET | Refills: 1 | Status: SHIPPED | OUTPATIENT
Start: 2021-02-26 | End: 2021-06-23 | Stop reason: SDUPTHER

## 2021-02-26 RX ORDER — GLIMEPIRIDE 2 MG/1
TABLET ORAL
Qty: 90 TABLET | Refills: 1 | Status: SHIPPED | OUTPATIENT
Start: 2021-02-26 | End: 2021-06-23 | Stop reason: SDUPTHER

## 2021-02-26 RX ORDER — FENOFIBRATE 54 MG/1
54 TABLET ORAL DAILY
Refills: 1 | Status: DISCONTINUED | OUTPATIENT
Start: 2021-02-26 | End: 2021-02-26 | Stop reason: HOSPADM

## 2021-02-26 RX ORDER — INSULIN GLARGINE 100 [IU]/ML
15 INJECTION, SOLUTION SUBCUTANEOUS ONCE
Status: COMPLETED | OUTPATIENT
Start: 2021-02-26 | End: 2021-02-26

## 2021-02-26 RX ORDER — ICOSAPENT ETHYL 1000 MG/1
1 CAPSULE ORAL 2 TIMES DAILY
Qty: 60 CAPSULE | Refills: 1 | Status: CANCELLED | OUTPATIENT
Start: 2021-02-26

## 2021-02-26 RX ORDER — SIMVASTATIN 80 MG
TABLET ORAL
Qty: 90 TABLET | Refills: 1 | Status: SHIPPED | OUTPATIENT
Start: 2021-02-26 | End: 2021-06-30 | Stop reason: SDUPTHER

## 2021-02-26 RX ORDER — ASPIRIN 81 MG/1
81 TABLET ORAL DAILY
Status: DISCONTINUED | OUTPATIENT
Start: 2021-02-26 | End: 2021-02-26 | Stop reason: HOSPADM

## 2021-02-26 RX ORDER — ATORVASTATIN CALCIUM 40 MG/1
40 TABLET, FILM COATED ORAL NIGHTLY
Refills: 1 | Status: DISCONTINUED | OUTPATIENT
Start: 2021-02-26 | End: 2021-02-26 | Stop reason: HOSPADM

## 2021-02-26 RX ORDER — LANCING DEVICE
EACH MISCELLANEOUS
Qty: 180 EACH | Refills: 1 | Status: SHIPPED | OUTPATIENT
Start: 2021-02-26 | End: 2021-08-30 | Stop reason: SDUPTHER

## 2021-02-26 RX ORDER — 0.9 % SODIUM CHLORIDE 0.9 %
1000 INTRAVENOUS SOLUTION INTRAVENOUS ONCE
Status: COMPLETED | OUTPATIENT
Start: 2021-02-26 | End: 2021-02-26

## 2021-02-26 RX ORDER — EMPAGLIFLOZIN 10 MG/1
10 TABLET, FILM COATED ORAL DAILY
Qty: 90 TABLET | Refills: 1 | Status: CANCELLED | OUTPATIENT
Start: 2021-02-26

## 2021-02-26 RX ORDER — CALCIUM CITRATE/VITAMIN D3 200MG-6.25
TABLET ORAL
Qty: 300 EACH | Refills: 5 | Status: SHIPPED | OUTPATIENT
Start: 2021-02-26

## 2021-02-26 RX ORDER — OCTISALATE, AVOBENZONE, HOMOSALATE, AND OCTOCRYLENE 29.4; 29.4; 49; 25.48 MG/ML; MG/ML; MG/ML; MG/ML
1 LOTION TOPICAL DAILY
Qty: 30 CAPSULE | Refills: 5 | Status: SHIPPED | OUTPATIENT
Start: 2021-02-26

## 2021-02-26 RX ORDER — TAMSULOSIN HYDROCHLORIDE 0.4 MG/1
0.4 CAPSULE ORAL DAILY
Qty: 30 CAPSULE | Refills: 0 | Status: SHIPPED | OUTPATIENT
Start: 2021-02-26 | End: 2021-06-30 | Stop reason: SDUPTHER

## 2021-02-26 RX ADMIN — DEXTROSE AND SODIUM CHLORIDE: 5; 450 INJECTION, SOLUTION INTRAVENOUS at 06:05

## 2021-02-26 RX ADMIN — SODIUM CHLORIDE 1000 ML: 9 INJECTION, SOLUTION INTRAVENOUS at 12:13

## 2021-02-26 RX ADMIN — ASPIRIN 81 MG: 81 TABLET, COATED ORAL at 13:41

## 2021-02-26 RX ADMIN — FENOFIBRATE 54 MG: 54 TABLET ORAL at 15:09

## 2021-02-26 RX ADMIN — INSULIN GLARGINE 15 UNITS: 100 INJECTION, SOLUTION SUBCUTANEOUS at 15:46

## 2021-02-26 RX ADMIN — ENOXAPARIN SODIUM 30 MG: 30 INJECTION SUBCUTANEOUS at 07:51

## 2021-02-26 RX ADMIN — INSULIN GLARGINE 25 UNITS: 100 INJECTION, SOLUTION SUBCUTANEOUS at 13:41

## 2021-02-26 ASSESSMENT — ENCOUNTER SYMPTOMS
APNEA: 0
CONSTIPATION: 0
ABDOMINAL PAIN: 0
COUGH: 0
ABDOMINAL DISTENTION: 0
VOMITING: 0
CHEST TIGHTNESS: 0
NAUSEA: 0
SHORTNESS OF BREATH: 0
WHEEZING: 0
DIARRHEA: 0

## 2021-02-26 NOTE — PROGRESS NOTES
Patient presents with right flank pain for the past week. Denies trauma. CT abdomen pelvis shows peripancreatic stranding and fluid, consistent with acute pancreatitis. Lipase 126. Patient has a history of diabetes mellitus, bipolar disorder, and hyperlipidemia. Initial glucose 275, anion gap 21, bicarb 14, and ketones 4.31. Patient was started on insulin infusion in the ED for DKA. He was also given IV fluids. Pharmacist in the ER reports that the patient has been off of his medications since March 2020 because he does not trust his doctors or medications. Patient will be admitted to ICU.

## 2021-02-26 NOTE — PROGRESS NOTES
Pharmacy Medication History Note      List of current medications patient is taking is complete. Source of information: Patient    All medications removed from list, patient states he does not trust doctors or medications at this time. Patient has been off all medications since March 2020. Denies use of other OTC or herbal medications. Allergies clarified - Latex and cat dander    Please let me know if you have any questions about this encounter. Thank you!     Electronically signed by Deepa Pagan, [address] [place] [address] on 2/25/2021 at 7:21 PM

## 2021-02-26 NOTE — DISCHARGE SUMMARY
[address]    Discharge Summary     Patient ID: Tavares Vargas  :  1975   MRN: [id number]     ACCOUNT:  [de-identified]   Patient's PCP: CLIF Baird CNP  Admit Date: 2021   Discharge Date: 2021    Length of Stay: 1  Code Status:  Full Code  Admitting Physician: Le Mejias MD  Discharge Physician: Merry Schilder, MD     Active Discharge Diagnoses:       Primary Problem  DKA, type 2, not at goal [facility])      [place] Problems    Diagnosis Date Noted    DKA, type 2, not at goal [facility]) [E11.10] 2021    Acute pancreatitis [K85.90] 2021    Noncompliance with medication regimen [Z91.14] 2021    Bipolar 1 disorder ([facility] [address] [F31.9] 2019    Diabetes mellitus ([facility] [E11.9] 2018    Hyperlipidemia [E78.5] 2018       Admission Condition:  poor     Discharged Condition: stable    Hospital Stay:       Hospital Course:  Tavares Vargas is a 39 y.o. male who was admitted for the management of  DKA, type 2, not at goal [facility]) , presented to ER with Flank Pain  Patient was found to be in DKA with elevated beta hydroxybutyrate, blood sugar, low bicarb and increased anion gap. Patient was started on DKA protocol with insulin infusion, IV fluids, n.p.o.  CT abdomen pelvis without contrast also showed peripancreatic stranding and fluid consistent with acute pancreatitis, lipase was mildly elevated at 126. Patient was kept n.p.o. with IV fluids and every 4 hours BMP monitoring. 2 successive normal BMPs were obtained at 1 PM and patient was bridged from insulin infusion to Lantus, carb controlled diet was started. Patient remained stable, denied any nausea/vomiting, abdominal pain or any other complaints. Patient was strongly insistent that he needs to leave the hospital by 3 PM since he has worked in the evening he has to get to work.   Patient stated that he would have to leave AMA if not discharged. Patient was counseled in detail regarding the risks and complications associated with DKA and pancreatitis including the risk of patient going back into DKA or developing severe pancreatitis, risk for ICU readmission and the long-term complications of uncontrolled diabetes mellitus. Patient was informed that the ideal recommendation would be for the patient to stay overnight for monitoring, patient refused. Patient has been noncompliant with his medications over the last year, his antidiabetic and hyperlipidemia medications were ordered at discharge and patient was advised to be compliant with these medications. Patient is medically stable at this time, cleared for discharge and counseled regarding dietary recommendations and importance of medication compliance.     Significant therapeutic interventions:   Insulin infusion  N.p.o.  IV fluids    Significant Diagnostic Studies:   Labs / Micro:  CBC:   Lab Results   Component Value Date    WBC 5.9 02/26/2021    RBC 4.99 02/26/2021    HGB 14.7 02/26/2021    HCT 42.7 02/26/2021    MCV 85.6 02/26/2021    MCH 29.5 02/26/2021    MCHC 34.4 02/26/2021    RDW 15.5 02/26/2021     02/26/2021     CMP:    Lab Results   Component Value Date    GLUCOSE 172 02/26/2021     02/26/2021    K 3.8 02/26/2021     02/26/2021    CO2 25 02/26/2021    BUN 9 02/26/2021    CREATININE 0.63 02/26/2021    ANIONGAP 9 02/26/2021    ALKPHOS 88 02/25/2021    ALT 16 02/25/2021    AST 39 02/25/2021    BILITOT 0.46 02/25/2021    LABALBU 3.7 02/25/2021    ALBUMIN NOT REPORTED 02/25/2021    LABGLOM >60 02/26/2021    GFRAA >60 02/26/2021    GFR      02/26/2021    GFR NOT REPORTED 02/26/2021    PROT 7.9 02/25/2021    CALCIUM 9.4 02/26/2021     PT/INR:  No results found for: PTINR, PROTIME, INR  PTT: No results found for: APTT  FLP:    Lab Results   Component Value Date    CHOL 224 02/26/2021    TRIG 787 02/26/2021    HDL 21 02/26/2021     U/A:    Lab Results Component Value Date    COLORU YELLOW 05/05/2016    TURBIDITY CLEAR 05/05/2016    SPECGRAV 1.035 05/05/2016    HGBUR NEGATIVE 05/05/2016    PHUR 7.0 05/05/2016    PROTEINU 1+ 05/05/2016    GLUCOSEU 3+ 05/05/2016    KETUA MOD 05/05/2016    BILIRUBINUR NEGATIVE 05/05/2016    UROBILINOGEN Normal 05/05/2016    NITRU NEGATIVE 05/05/2016    LEUKOCYTESUR NEGATIVE 05/05/2016     TSH:    Lab Results   Component Value Date    TSH 3.05 09/19/2018         Radiology:    Ct Abdomen Pelvis Wo Contrast    Result Date: 2/25/2021  EXAMINATION: CT OF THE ABDOMEN AND PELVIS WITHOUT CONTRAST 2/25/2021 2:54 pm TECHNIQUE: CT of the abdomen and pelvis was performed without the administration of intravenous contrast. Multiplanar reformatted images are provided for review. Dose modulation, iterative reconstruction, and/or weight based adjustment of the mA/kV was utilized to reduce the radiation dose to as low as reasonably achievable. COMPARISON: None. HISTORY: ORDERING SYSTEM PROVIDED HISTORY: R flank pain TECHNOLOGIST PROVIDED HISTORY: R flank pain Decision Support Exception->Emergency Medical Condition (MA) Reason for Exam: R flank pain Additional signs and symptoms: complains of bilateral kidney pain for the past week, pain now just on right side FINDINGS: Lower Chest: No acute abnormality within the visualized lung bases. Organs: The liver is diffusely hypoattenuating. Within the limitations of a noncontrast examination, no acute abnormality within the gallbladder or adrenal glands. The spleen is enlarged, measuring 17 cm. There is peripancreatic stranding and fluid without loculated fluid collection. No nephrolithiasis or hydronephrosis. GI/Bowel: Stomach is partially distended. The small bowel is nondilated. The colon is nondilated. The appendix is not confidently identified. Pelvis: Bladder is partially distended without vesicular stone. Prostate is within normal limits for size.  Peritoneum/Retroperitoneum: No ascites or pneumoperitoneum. The abdominal aorta is normal in caliber. Retroaortic left renal vein is noted. Bones/Soft Tissues: Multilevel degenerative changes of the lumbar spine. 1. Peripancreatic stranding and fluid, consistent with acute pancreatitis. 2. Hepatic steatosis. 3. Splenomegaly. Xr Chest Portable    Result Date: 2/25/2021  EXAMINATION: ONE XRAY VIEW OF THE CHEST 2/25/2021 2:54 pm COMPARISON: None. HISTORY: ORDERING SYSTEM PROVIDED HISTORY: SOB x1 week TECHNOLOGIST PROVIDED HISTORY: SOB x1 week Reason for Exam: sob x 1 week. kidney pain Acuity: Acute Type of Exam: Initial FINDINGS: Cardiomediastinal silhouette, hilar structures and pulmonary vascularity are within normal limits. No focal lung consolidation or infiltrate. No pleural effusion or pneumothorax. Osseous structures are grossly intact. Negative portable chest exam         Consultations:    Consults:     Final Specialist Recommendations/Findings:   IP CONSULT TO INTERNAL MEDICINE      The patient was seen and examined on day of discharge and this discharge summary is in conjunction with any daily progress note from day of discharge. Discharge plan:       Disposition: Home    Physician Follow Up:     No follow-up provider specified. Requiring Further Evaluation/Follow Up POST HOSPITALIZATION/Incidental Findings: Follow-up with PCP for T2DM  Psych follow-up for bipolar disorder    Diet: diabetic diet    Activity: As tolerated    Instructions to Patient:   Follow-up with PCP regularly  Compliance with medication    Discharge Medications:      Medication List      STOP taking these medications    Pharmacist Choice Lancets Misc            Time Spent on discharge is  34 mins in patient examination, evaluation, counseling as well as medication reconciliation, prescriptions for required medications, discharge plan and follow up.     Electronically signed by   Huan Tirado MD  2/26/2021  3:04 PM      Thank you Dr. Josefina Reynoso, APRN - CNP for the opportunity to be involved in this patient's care.

## 2021-02-26 NOTE — PLAN OF CARE
Pt a/o x4 no c/o pain  Remains on insulin gtt  Pt states he hasnt had meds in quite some time does not follow a carb control diet d/t financial hardship.

## 2021-02-26 NOTE — H&P
[address]      [address] [facility]     HISTORY AND PHYSICAL EXAMINATION            Date:   2/26/2021  Patient name:  Scott Lyons  Date of admission:  2/25/2021  2:28 PM  MRN:   [id number]  Account:  [de-identified]  YOB: 1975  PCP:    CLIF Mckay CNP  Room:   2006/2006-01  Code Status:    Full Code    Chief Complaint:     Chief Complaint   Patient presents with    Flank Pain       History Obtained From:     patient    History of Present Illness: The patient is a 39 y.o. Non-/non  male who presents withFlank Pain   and he is admitted to the hospital for the management of DKA and acute pancreatitis. Patient presented with a complaint of right flank pain which initially started as bilateral flank pain about 10 days ago. Patient describes the pain as moderate intensity, continuous, sharp nonradiating, no aggravating or relieving factors. Patient denies any falls or trauma, denies any nausea, vomiting, urinary complaints, confusion. Patient denies any other associated symptoms associated with the flank pain. Patient also expressed some concern regarding his blood sugars being higher than normal, is a type II diabetic and has not been taking any medications. Patient states that he has not taken any of his medications since March 2020, ran out of them and could not visit a doctor due to Covid. Patient denies any past history of DKA episodes, states he has had pancreatitis previously. 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 Patient states he has trust issues with doctors and does not always take medications, wants to go to a doctor who can manage all of his conditions without referring him to multiple specialists.   Patient has a history of bipolar disorder states he does not take any of his psych medications because they dull his mood and he does not enjoy anything. Also complains of erectile dysfunction with psychiatry meds. Patient was counseled in detail regarding the need for regular follow-up with a physician. In the ED patient was worked up with CBC, CMP, lipid profile, beta hydroxybutyrate, CXR and CT abdomen pelvis without contrast.  Work-up was remarkable for acute pancreatitis on CT abdomen, BHB elevation and electrolytes reflecting metabolic acidosis. His blood glucose was elevated at 345 and HbA1c elevated at 13.7. Patient was admitted for management of DKA and pancreatitis. Past Medical History:     Past Medical History:   Diagnosis Date    ADHD (attention deficit hyperactivity disorder)     Anxiety     Bipolar 2 disorder (HCC)     BPH (benign prostatic hyperplasia)     Carpal tunnel syndrome     Gout     Hyperlipidemia     Type 2 diabetes mellitus without complication (HCC)         Past SurgicalHistory:     Past Surgical History:   Procedure Laterality Date    APPENDECTOMY          Medications Prior to Admission:        Prior to Admission medications    Not on File        Allergies:     Latex    Social History:     Tobacco:    reports that he has never smoked. He has never used smokeless tobacco.  Alcohol:      reports no history of alcohol use. Drug Use:  reports current drug use. Drug: Marijuana. Family History:     Family History   Problem Relation Age of Onset    Cancer Mother         cervical    Colon Cancer Father         passed at 58 with second episode of colon cancer    Diabetes Father     Stroke Maternal Grandfather     Diabetes Paternal Grandmother     Stroke Paternal Grandmother        Review of Systems:     Positive and Negative as described in HPI. Review of Systems   Constitutional: Negative for activity change, appetite change, chills, diaphoresis and fever.    Respiratory: Negative for apnea, cough, chest tightness, shortness of Psychiatric:         Mood and Affect: Mood normal.         Investigations:     Laboratory Testing:  Recent Results (from the past 24 hour(s))   POC Glucose Fingerstick    Collection Time: 02/25/21  2:36 PM   Result Value Ref Range    POC Glucose 345 (H) 75 - 110 mg/dL   Blood Gas, Venous    Collection Time: 02/25/21  2:42 PM   Result Value Ref Range    pH, Christiano 7.384 7.320 - 7.420    pCO2, Christiano 29.3 (L) 39.0 - 55.0    pO2, Christiano 77.9 (H) 30.0 - 50.0    HCO3, Venous 17.5 (L) 24.0 - 30.0 mmol/L    Positive Base Excess, Christiano NOT REPORTED 0.0 - 2.0 mmol/L    Negative Base Excess, Christiano 7.6 (H) 0.0 - 2.0 mmol/L    O2 Sat, Christiano 92.7 (H) 60.0 - 85.0 %    Total Hb NOT REPORTED 12.0 - 16.0 g/dl    Oxyhemoglobin NOT REPORTED 95.0 - 98.0 %    Carboxyhemoglobin 3.5 0 - 5 %    Methemoglobin 0.6 0.0 - 1.9 %    Pt Temp 37     pH, Christiano, Temp Adj NOT REPORTED 7.320 - 7.420    pCO2, Christiano, Temp Adj NOT REPORTED 39.0 - 55.0 mmHg    pO2, Christiano, Temp Adj NOT REPORTED 30.0 - 50.0 mmHg    O2 Device/Flow/% ROOM AIR     Respiratory Rate NOT REPORTED     David Test NOT REPORTED     Sample Site NOT REPORTED     Pt.  Position NOT REPORTED     Mode NOT REPORTED     Set Rate NOT REPORTED     Total Rate NOT REPORTED     VT NOT REPORTED     FIO2 NOT REPORTED     Peep/Cpap NOT REPORTED     PSV NOT REPORTED     Text for Respiratory NOT REPORTED     NOTIFICATION NOT REPORTED     NOTIFICATION TIME NOT REPORTED    CBC Auto Differential    Collection Time: 02/25/21  2:50 PM   Result Value Ref Range    WBC 8.2 3.5 - 11.0 k/uL    RBC 5.55 4.5 - 5.9 m/uL    Hemoglobin 16.1 13.5 - 17.5 g/dL    Hematocrit 47.9 41 - 53 %    MCV 86.3 80 - 100 fL    MCH 29.0 26 - 34 pg    MCHC 33.6 31 - 37 g/dL    RDW 15.8 (H) 11.5 - 14.9 %    Platelets 265 790 - 879 k/uL    MPV 8.6 6.0 - 12.0 fL    NRBC Automated NOT REPORTED per 100 WBC    Differential Type NOT REPORTED     Immature Granulocytes NOT REPORTED 0 %    Absolute Immature Granulocyte NOT REPORTED 0.00 - 0.30 k/uL    WBC Morphology NOT REPORTED     RBC Morphology NOT REPORTED     Platelet Estimate NOT REPORTED     Seg Neutrophils 64 36 - 66 %    Lymphocytes 28 24 - 44 %    Monocytes 5 1 - 7 %    Eosinophils % 2 0 - 4 %    Basophils 1 0 - 2 %    Segs Absolute 5.25 1.3 - 9.1 k/uL    Absolute Lymph # 2.30 1.0 - 4.8 k/uL    Absolute Mono # 0.41 0.1 - 1.3 k/uL    Absolute Eos # 0.16 0.0 - 0.4 k/uL    Basophils Absolute 0.08 0.0 - 0.2 k/uL    Morphology ANISOCYTOSIS PRESENT     Morphology 1+ POLYCHROMASIA     Morphology 1+ TEARDROPS    Beta-Hydroxybutyrate    Collection Time: 02/25/21  5:10 PM   Result Value Ref Range    Beta-Hydroxybutyrate 4.31 (H) 0.02 - 0.27 mmol/L   Comprehensive Metabolic Panel    Collection Time: 02/25/21  5:10 PM   Result Value Ref Range    Glucose 275 (H) 70 - 99 mg/dL    BUN 8 6 - 20 mg/dL    CREATININE 0.79 0.70 - 1.20 mg/dL    Bun/Cre Ratio NOT REPORTED 9 - 20    Calcium 9.8 8.6 - 10.4 mg/dL    Sodium 139 135 - 144 mmol/L    Potassium 5.4 (H) 3.7 - 5.3 mmol/L    Chloride 104 98 - 107 mmol/L    CO2 14 (L) 20 - 31 mmol/L    Anion Gap 21 (H) 9 - 17 mmol/L    Alkaline Phosphatase 88 40 - 129 U/L    ALT 16 5 - 41 U/L    AST 39 <40 U/L    Total Bilirubin 0.46 0.3 - 1.2 mg/dL    Total Protein 7.9 6.4 - 8.3 g/dL    Albumin 3.7 3.5 - 5.2 g/dL    Albumin/Globulin Ratio NOT REPORTED 1.0 - 2.5    GFR Non-African American >60 >60 mL/min    GFR African American >60 >60 mL/min    GFR Comment          GFR Staging NOT REPORTED    Lipase    Collection Time: 02/25/21  5:10 PM   Result Value Ref Range    Lipase 126 (H) 13 - 60 U/L   Magnesium    Collection Time: 02/25/21  5:10 PM   Result Value Ref Range    Magnesium 2.0 1.6 - 2.6 mg/dL   POC Glucose Fingerstick    Collection Time: 02/25/21  7:45 PM   Result Value Ref Range    POC Glucose 234 (H) 75 - 110 mg/dL   POC Glucose Fingerstick    Collection Time: 02/25/21  9:10 PM   Result Value Ref Range    POC Glucose 183 (H) 75 - 110 mg/dL   POC Glucose Fingerstick    Collection Time: 02/25/21 10:14 PM   Result Value Ref Range    POC Glucose 161 (H) 75 - 110 mg/dL   BASIC METABOLIC PANEL    Collection Time: 02/25/21 11:07 PM   Result Value Ref Range    Glucose 139 (H) 70 - 99 mg/dL    BUN 8 6 - 20 mg/dL    CREATININE <0.40 (L) 0.70 - 1.20 mg/dL    Bun/Cre Ratio NOT REPORTED 9 - 20    Calcium 9.6 8.6 - 10.4 mg/dL    Sodium 136 135 - 144 mmol/L    Potassium 3.8 3.7 - 5.3 mmol/L    Chloride 101 98 - 107 mmol/L    CO2 21 20 - 31 mmol/L    Anion Gap 14 9 - 17 mmol/L    GFR Non- CANNOT BE CALCULATED >60 mL/min    GFR  CANNOT BE CALCULATED >60 mL/min    GFR Comment          GFR Staging NOT REPORTED    Magnesium    Collection Time: 02/25/21 11:07 PM   Result Value Ref Range    Magnesium 1.9 1.6 - 2.6 mg/dL   PHOSPHORUS    Collection Time: 02/25/21 11:07 PM   Result Value Ref Range    Phosphorus 3.2 2.5 - 4.5 mg/dL   POC Glucose Fingerstick    Collection Time: 02/25/21 11:27 PM   Result Value Ref Range    POC Glucose 155 (H) 75 - 110 mg/dL   POC Glucose Fingerstick    Collection Time: 02/26/21 12:42 AM   Result Value Ref Range    POC Glucose 167 (H) 75 - 110 mg/dL   POC Glucose Fingerstick    Collection Time: 02/26/21  1:26 AM   Result Value Ref Range    POC Glucose 105 75 - 110 mg/dL   POC Glucose Fingerstick    Collection Time: 02/26/21  2:33 AM   Result Value Ref Range    POC Glucose 224 (H) 75 - 110 mg/dL   POC Glucose Fingerstick    Collection Time: 02/26/21  3:29 AM   Result Value Ref Range    POC Glucose 201 (H) 75 - 110 mg/dL   CBC    Collection Time: 02/26/21  4:06 AM   Result Value Ref Range    WBC 5.9 3.5 - 11.0 k/uL    RBC 4.99 4.5 - 5.9 m/uL    Hemoglobin 14.7 13.5 - 17.5 g/dL    Hematocrit 42.7 41 - 53 %    MCV 85.6 80 - 100 fL    MCH 29.5 26 - 34 pg    MCHC 34.4 31 - 37 g/dL    RDW 15.5 (H) 11.5 - 14.9 %    Platelets 382 666 - 957 k/uL    MPV 8.1 6.0 - 12.0 fL    NRBC Automated NOT REPORTED per 100 WBC   BASIC METABOLIC PANEL    Collection Time: 02/26/21  4:06 AM   Result Value Ref Range    Glucose 174 (H) 70 - 99 mg/dL    BUN 8 6 - 20 mg/dL    CREATININE 0.59 (L) 0.70 - 1.20 mg/dL    Bun/Cre Ratio NOT REPORTED 9 - 20    Calcium 9.4 8.6 - 10.4 mg/dL    Sodium 138 135 - 144 mmol/L    Potassium 3.5 (L) 3.7 - 5.3 mmol/L    Chloride 103 98 - 107 mmol/L    CO2 20 20 - 31 mmol/L    Anion Gap 15 9 - 17 mmol/L    GFR Non-African American >60 >60 mL/min    GFR African American >60 >60 mL/min    GFR Comment          GFR Staging NOT REPORTED    Hemoglobin A1C    Collection Time: 02/26/21  4:06 AM   Result Value Ref Range    Hemoglobin A1C 13.7 (H) 4.0 - 6.0 %    Estimated Avg Glucose 346 mg/dL   Lipid Profile    Collection Time: 02/26/21  4:06 AM   Result Value Ref Range    Cholesterol 224 (H) <200 mg/dL    HDL 21 (L) >40 mg/dL    LDL Cholesterol      0 - 130 mg/dL    Chol/HDL Ratio 10.7 (H) <5    Triglycerides 787 (H) <150 mg/dL    VLDL NOT REPORTED 1 - 30 mg/dL   Magnesium    Collection Time: 02/26/21  4:06 AM   Result Value Ref Range    Magnesium 1.8 1.6 - 2.6 mg/dL   Phosphorus    Collection Time: 02/26/21  4:06 AM   Result Value Ref Range    Phosphorus 3.5 2.5 - 4.5 mg/dL   LDL Cholesterol, Direct    Collection Time: 02/26/21  4:06 AM   Result Value Ref Range    LDL Direct 50 <100 mg/dL   POC Glucose Fingerstick    Collection Time: 02/26/21  4:45 AM   Result Value Ref Range    POC Glucose 150 (H) 75 - 110 mg/dL   POC Glucose Fingerstick    Collection Time: 02/26/21  6:00 AM   Result Value Ref Range    POC Glucose 173 (H) 75 - 110 mg/dL   POC Glucose Fingerstick    Collection Time: 02/26/21  6:59 AM   Result Value Ref Range    POC Glucose 156 (H) 75 - 110 mg/dL   POC Glucose Fingerstick    Collection Time: 02/26/21  8:09 AM   Result Value Ref Range    POC Glucose 142 (H) 75 - 110 mg/dL   BASIC METABOLIC PANEL    Collection Time: 02/26/21  8:46 AM   Result Value Ref Range    Glucose 127 (H) 70 - 99 mg/dL    BUN 9 6 - 20 mg/dL    CREATININE 0.58 (L) 0.70 - 1.20 mg/dL    Bun/Cre Ratio NOT REPORTED 9 - 20    Calcium 9.6 8.6 - 10.4 mg/dL    Sodium 138 135 - 144 mmol/L    Potassium 3.8 3.7 - 5.3 mmol/L    Chloride 103 98 - 107 mmol/L    CO2 24 20 - 31 mmol/L    Anion Gap 11 9 - 17 mmol/L    GFR Non-African American >60 >60 mL/min    GFR African American >60 >60 mL/min    GFR Comment          GFR Staging NOT REPORTED    Magnesium    Collection Time: 02/26/21  8:46 AM   Result Value Ref Range    Magnesium 1.9 1.6 - 2.6 mg/dL   PHOSPHORUS    Collection Time: 02/26/21  8:46 AM   Result Value Ref Range    Phosphorus 3.4 2.5 - 4.5 mg/dL   POC Glucose Fingerstick    Collection Time: 02/26/21  9:05 AM   Result Value Ref Range    POC Glucose 137 (H) 75 - 110 mg/dL   POC Glucose Fingerstick    Collection Time: 02/26/21  9:59 AM   Result Value Ref Range    POC Glucose 154 (H) 75 - 110 mg/dL   POC Glucose Fingerstick    Collection Time: 02/26/21 11:13 AM   Result Value Ref Range    POC Glucose 192 (H) 75 - 110 mg/dL   BASIC METABOLIC PANEL    Collection Time: 02/26/21 12:04 PM   Result Value Ref Range    Glucose 172 (H) 70 - 99 mg/dL    BUN 9 6 - 20 mg/dL    CREATININE 0.63 (L) 0.70 - 1.20 mg/dL    Bun/Cre Ratio NOT REPORTED 9 - 20    Calcium 9.4 8.6 - 10.4 mg/dL    Sodium 136 135 - 144 mmol/L    Potassium 3.8 3.7 - 5.3 mmol/L    Chloride 102 98 - 107 mmol/L    CO2 25 20 - 31 mmol/L    Anion Gap 9 9 - 17 mmol/L    GFR Non-African American >60 >60 mL/min    GFR African American >60 >60 mL/min    GFR Comment          GFR Staging NOT REPORTED    Magnesium    Collection Time: 02/26/21 12:04 PM   Result Value Ref Range    Magnesium 1.7 1.6 - 2.6 mg/dL   PHOSPHORUS    Collection Time: 02/26/21 12:04 PM   Result Value Ref Range    Phosphorus 3.1 2.5 - 4.5 mg/dL   POC Glucose Fingerstick    Collection Time: 02/26/21 12:06 PM   Result Value Ref Range    POC Glucose 187 (H) 75 - 110 mg/dL       Imaging/Diagnostics:  Ct Abdomen Pelvis Wo Contrast    Result Date: 2/25/2021  EXAMINATION: CT OF THE ABDOMEN AND PELVIS WITHOUT CONTRAST 2/25/2021 2:54 pm TECHNIQUE: CT of the abdomen and pelvis was performed without the administration of intravenous contrast. Multiplanar reformatted images are provided for review. Dose modulation, iterative reconstruction, and/or weight based adjustment of the mA/kV was utilized to reduce the radiation dose to as low as reasonably achievable. COMPARISON: None. HISTORY: ORDERING SYSTEM PROVIDED HISTORY: R flank pain TECHNOLOGIST PROVIDED HISTORY: R flank pain Decision Support Exception->Emergency Medical Condition (MA) Reason for Exam: R flank pain Additional signs and symptoms: complains of bilateral kidney pain for the past week, pain now just on right side FINDINGS: Lower Chest: No acute abnormality within the visualized lung bases. Organs: The liver is diffusely hypoattenuating. Within the limitations of a noncontrast examination, no acute abnormality within the gallbladder or adrenal glands. The spleen is enlarged, measuring 17 cm. There is peripancreatic stranding and fluid without loculated fluid collection. No nephrolithiasis or hydronephrosis. GI/Bowel: Stomach is partially distended. The small bowel is nondilated. The colon is nondilated. The appendix is not confidently identified. Pelvis: Bladder is partially distended without vesicular stone. Prostate is within normal limits for size. Peritoneum/Retroperitoneum: No ascites or pneumoperitoneum. The abdominal aorta is normal in caliber. Retroaortic left renal vein is noted. Bones/Soft Tissues: Multilevel degenerative changes of the lumbar spine. 1. Peripancreatic stranding and fluid, consistent with acute pancreatitis. 2. Hepatic steatosis. 3. Splenomegaly. Xr Chest Portable    Result Date: 2/25/2021  EXAMINATION: ONE XRAY VIEW OF THE CHEST 2/25/2021 2:54 pm COMPARISON: None. HISTORY: ORDERING SYSTEM PROVIDED HISTORY: SOB x1 week TECHNOLOGIST PROVIDED HISTORY: SOB x1 week Reason for Exam: sob x 1 week. kidney pain Acuity: Acute Type of Exam: Initial FINDINGS: Cardiomediastinal silhouette, hilar structures and pulmonary vascularity are within normal limits. No focal lung consolidation or infiltrate. No pleural effusion or pneumothorax. Osseous structures are grossly intact. Negative portable chest exam       Assessment :      Primary Problem  DKA, type 2, not at goal [facility])    Active Hospital Problems    Diagnosis Date Noted    DKA, type 2, not at goal [facility]) [E11.10] 02/25/2021    Acute pancreatitis [K85.90] 02/25/2021    Noncompliance with medication regimen [Z91.14] 02/25/2021    Bipolar 1 disorder ([facility] [address] [F31.9] 01/30/2019    Diabetes mellitus ([facility] [E11.9] 09/19/2018    Hyperlipidemia [E78.5] 09/19/2018       Plan:     Patient status Admit as inpatient in the  Medical ICU    DKA 2/2 uncontrolled type 2 diabetes mellitus  -Bicarb 14, anion gap 21, POC glucose 345 at presentation  -Bicarb 25, anion gap 9 on 2/26/2021  -BHB 4.31  -Hemoglobin A1c 13.7  -Insulin infusion  -IV fluids per DKA protocol  -BMP every 4 hourly  -N.p.o. initially, now advancing diet    Acute pancreatitis  -Prior history of pancreatitis, currently denies any symptoms besides mild abdominal pain  -Lipase 126  -CT abdomen pelvis without contrast: Peripancreatic stranding and fluid, consistent with acute pancreatitis, hepatic steatosis. -IV fluids  -N.p.o. initially, now advancing diet     Diet: Carb controlled diet  DVT prophylaxis: Lovenox 40 mg once daily  GI prophylaxis: Not indicated  PT/OT/SW    Disposition: Home    Consultations:   IP CONSULT TO INTERNAL MEDICINE    Patient is admitted as inpatient status because of co-morbiditieslisted above, severity of signs and symptoms as outlined, requirement for current medical therapies and most importantly because of direct risk to patient if care not provided in a hospital setting.     Alexx Salas MD  2/26/2021  2:15 PM    Copy sent to Dr. Julio Shearer, APRN - CNP

## 2021-02-26 NOTE — DISCHARGE INSTR - COC
Continuity of Care Form    Patient Name: Evie Sanchez   :  1975  MRN:  [id number]    Admit date:  2021  Discharge date:  ***    Code Status Order: Full Code   Advance Directives:   Advance Care Flowsheet Documentation     Date/Time Healthcare Directive Type of Healthcare Directive Copy in [address] Agent's Name Healthcare Agent's Phone Number    21 [phone]  No, patient does not have an advance directive for healthcare treatment -- -- -- -- --          Admitting Physician:  Nafisa Escalera MD  PCP: Mikhail Benson, CLIF - CNP    Discharging Nurse: [facility] Care Unit/Room#:   Discharging Unit Phone Number: ***    Emergency Contact:   Extended Emergency Contact Information  Primary Emergency Contact: JetLatonya  Address: [address] Phone: [phone]  Relation: Spouse    Past Surgical History:  Past Surgical History:   Procedure Laterality Date    APPENDECTOMY         Immunization History:   Immunization History   Administered Date(s) Administered    Influenza Virus Vaccine 10/27/2011, 2012, 2015    Influenza, Odette Osmel, IM, (6 mo and older Fluzone, Flulaval, Fluarix and 3 yrs and older Afluria) 2018, 09/10/2019    Pneumococcal Polysaccharide ([id number]) 2018    Tdap (Boostrix, Adacel) 2018       Active Problems:  Patient Active Problem List   Diagnosis Code    Diabetes mellitus ([facility] [address] E11.9    Gout M10.9    Hyperlipidemia E78.5    Chronic abdominal pain R10.9, G89.29    Nausea & vomiting R11.2    Anxiety F41.9    Curvature of the penis Q55.61    Bipolar 1 disorder (HCC) F31.9    DKA, type 2, not at goal [facility]) E11.10    Acute pancreatitis K85.90    Noncompliance with medication regimen Z91.14       Isolation/Infection:   Isolation          No Isolation        Patient Infection Status     None to display          Nurse Assessment:  Last Vital Signs: BP (!) 157/90   Pulse 88   Temp 98.5 °F (36.9 °C)   Resp 19   Ht 5' 9\" (1.753 m)   Wt 223 lb 11.2 oz (101.5 kg)   SpO2 97%   BMI 33.03 kg/m²     Last documented pain score (0-10 scale): Pain Level: 1  Last Weight:   Wt Readings from Last 1 Encounters:   02/25/21 223 lb 11.2 oz (101.5 kg)     Mental Status:  {IP PT MENTAL STATUS:20030}    IV Access:  { LUCERO IV ACCESS:116349325}    Nursing Mobility/ADLs:  Walking   {CHP DME IZNO:549278373}  Transfer  {CHP DME TTPD:590593676}  Bathing  {CHP DME MNJE:185143559}  Dressing  {CHP DME TNPY:354588433}  Toileting  {CHP DME ALAA:746531983}  Feeding  {CHP DME WPET:172837862}  Med Admin  {P DME IPXB:493449092}  Med Delivery   { LUCERO MED Delivery:098085465}    Wound Care Documentation and Therapy:        Elimination:  Continence:   · Bowel: {YES / MQ:94618}  · Bladder: {YES / CX:40529}  Urinary Catheter: {Urinary Catheter:323212096}   Colostomy/Ileostomy/Ileal Conduit: {YES / FH:09350}       Date of Last BM: ***    Intake/Output Summary (Last 24 hours) at 2/26/2021 1550  Last data filed at 2/26/2021 0700  Gross per 24 hour   Intake 1042.68 ml   Output --   Net 1042.68 ml     I/O last 3 completed shifts: In: 1042.7 [I.V.:42.7;  IV Piggyback:1000]  Out: -     Safety Concerns:     { LUCERO Safety Concerns:908645469}    Impairments/Disabilities:      [address] LUCERO Impairments/Disabilities:143350337}    Nutrition Therapy:  Current Nutrition Therapy:   [address] LUCERO Diet List:039325648}    Routes of Feeding: {CHP DME Other Feedings:157737092}  Liquids: {Slp liquid thickness:51567}  Daily Fluid Restriction: {CHP DME Yes amt example:051191256}  Last Modified Barium Swallow with Video (Video Swallowing Test): {Done Not Done UEVB:284508846}    Treatments at the Time of Hospital Discharge:   Respiratory Treatments: ***  Oxygen Therapy:  {Therapy; copd oxygen:82112}  Ventilator:    { CC Vent RESW:612173867}    Rehab Therapies: {THERAPEUTIC INTERVENTION:2075383055}  Weight Bearing Status/Restrictions: [address] CC Weight Bearin}  Other Medical Equipment (for information only, NOT a DME order):  {EQUIPMENT:733998115}  Other Treatments: ***    Patient's personal belongings (please select all that are sent with patient):  {CHP DME Belongings:229577898}    RN SIGNATURE:  {Esignature:314514634}    CASE MANAGEMENT/SOCIAL WORK SECTION    Inpatient Status Date: ***    Readmission Risk Assessment Score:  Readmission Risk              Risk of Unplanned Readmission:        8           Discharging to Facility/ Agency   · Name:   · Address:  · Phone:  · Fax:    Dialysis Facility (if applicable)   · Name:  · Address:  · Dialysis Schedule:  · Phone:  · Fax:    / signature: {Esignature:756430812}    PHYSICIAN SECTION    Prognosis: {Prognosis:4848355276}    Condition at Discharge: [address] Patient Condition:398592082}    Rehab Potential (if transferring to Rehab): {Prognosis:7558986410}    Recommended Labs or Other Treatments After Discharge: ***    Physician Certification: I certify the above information and transfer of Faby Amaro  is necessary for the continuing treatment of the diagnosis listed and that he requires {Admit to Appropriate Level of Care:49745} for {GREATER/LESS:506782932} 30 days.      Update Admission H&P: {CHP DME Changes in HVZOQ:134172167}    PHYSICIAN SIGNATURE:  {Esignature:012957959}

## 2021-02-26 NOTE — PROGRESS NOTES
Patient discharged out to his vehicle with all belongings. Discharge instructions went over. All questions answered.

## 2021-02-26 NOTE — PROGRESS NOTES
It is my impression the patient has insulin-dependent diabetes mellitus but is being treated with oral agents. Present his blood sugar is well controlled and his bicarb is up to 25. On my elective questioning he was asymptomatic. He is alert and orientated lungs are clear heart sounds are normal no audible murmur and gallop and abdomen is soft and nontender. Evaluation of amylase and lipase. CT scan also showed evidence of possible acute pancreatitis. Patient is adamant to go home later today and I think he can be discharged with instructions that he has to be compliant with his medication. He will be okay to give 1 dose of Lantus 20 units before discharge . It will prevent him from developing ketoacidosis in the next few days.   The patient was interviewed and examined by me and critical care was provided time more than 30 minutes

## 2021-03-01 ENCOUNTER — TELEPHONE (OUTPATIENT)
Dept: FAMILY MEDICINE CLINIC | Age: 46
End: 2021-03-01

## 2021-03-01 NOTE — TELEPHONE ENCOUNTER
[facility] Trans[facility] Initial Follow Up Call    Outreach made within 2 business days of discharge: Yes    Patient: Mehreen Ybarra Patient : [id number]   MRN: [id number]  Reason for Admission: There are no discharge diagnoses documented for the most recent discharge. Discharge Date: 21       Spoke with: patient    Discharge department/facility: [facility]. patient to do TCM call and make appointment,  Patient stated he doesn't know what for because Marta Broussard doesn't do anything for him. All she does is refer him to other doctors and he doesn't like that. He wants his provider to do everything and she isn't even a doctor she's a nurse practitioner. He stated that he doesn't want anything to do with this office especially because of Dr Virgilio Goodson and he then hung up on me. Follow Up  No future appointments.     Saman Lomeli MA

## 2021-06-23 ENCOUNTER — OFFICE VISIT (OUTPATIENT)
Dept: PRIMARY CARE CLINIC | Age: 46
End: 2021-06-23
Payer: COMMERCIAL

## 2021-06-23 VITALS
SYSTOLIC BLOOD PRESSURE: 154 MMHG | DIASTOLIC BLOOD PRESSURE: 107 MMHG | HEART RATE: 112 BPM | WEIGHT: 237 LBS | BODY MASS INDEX: 35 KG/M2 | TEMPERATURE: 97 F | OXYGEN SATURATION: 97 %

## 2021-06-23 DIAGNOSIS — E11.42 TYPE 2 DIABETES MELLITUS WITH DIABETIC POLYNEUROPATHY, WITHOUT LONG-TERM CURRENT USE OF INSULIN (HCC): ICD-10-CM

## 2021-06-23 DIAGNOSIS — Z23 COVID-19 VACCINE SERIES STARTED: Primary | ICD-10-CM

## 2021-06-23 DIAGNOSIS — E78.5 HYPERLIPIDEMIA, UNSPECIFIED HYPERLIPIDEMIA TYPE: ICD-10-CM

## 2021-06-23 DIAGNOSIS — Z11.59 ENCOUNTER FOR HEPATITIS C SCREENING TEST FOR LOW RISK PATIENT: ICD-10-CM

## 2021-06-23 DIAGNOSIS — Z11.4 SCREENING FOR HIV (HUMAN IMMUNODEFICIENCY VIRUS): ICD-10-CM

## 2021-06-23 PROCEDURE — 99214 OFFICE O/P EST MOD 30 MIN: CPT | Performed by: NURSE PRACTITIONER

## 2021-06-23 PROCEDURE — 2022F DILAT RTA XM EVC RTNOPTHY: CPT | Performed by: NURSE PRACTITIONER

## 2021-06-23 PROCEDURE — G8417 CALC BMI ABV UP PARAM F/U: HCPCS | Performed by: NURSE PRACTITIONER

## 2021-06-23 PROCEDURE — 1036F TOBACCO NON-USER: CPT | Performed by: NURSE PRACTITIONER

## 2021-06-23 PROCEDURE — 3046F HEMOGLOBIN A1C LEVEL >9.0%: CPT | Performed by: NURSE PRACTITIONER

## 2021-06-23 PROCEDURE — G8427 DOCREV CUR MEDS BY ELIG CLIN: HCPCS | Performed by: NURSE PRACTITIONER

## 2021-06-23 RX ORDER — GLIMEPIRIDE 2 MG/1
TABLET ORAL
Qty: 90 TABLET | Refills: 1 | Status: SHIPPED | OUTPATIENT
Start: 2021-06-23

## 2021-06-23 RX ORDER — ASPIRIN 81 MG/1
TABLET ORAL
Qty: 90 TABLET | Refills: 1 | Status: SHIPPED | OUTPATIENT
Start: 2021-06-23

## 2021-06-23 RX ORDER — FENOFIBRATE 48 MG/1
48 TABLET, COATED ORAL DAILY
Qty: 90 TABLET | Refills: 1 | Status: SHIPPED | OUTPATIENT
Start: 2021-06-23

## 2021-06-23 RX ORDER — METFORMIN HYDROCHLORIDE 500 MG/1
TABLET, EXTENDED RELEASE ORAL
Qty: 120 TABLET | Refills: 1 | Status: SHIPPED | OUTPATIENT
Start: 2021-06-23 | End: 2021-07-28

## 2021-06-23 ASSESSMENT — ENCOUNTER SYMPTOMS
COUGH: 0
SHORTNESS OF BREATH: 0

## 2021-06-23 NOTE — PROGRESS NOTES
Visit Information     Have you changed or started any medications since your last visit including any over-the-counter medicines, vitamins, or herbal medicines? no   Are you having any side effects from any of your medications? -  no  Have you stopped taking any of your medications? Is so, why? -  yes - Need refills     Have you seen any other physician or provider since your last visit? No  Have you had any other diagnostic tests since your last visit? No  Have you been seen in the emergency room and/or had an admission to a hospital since we last saw you? Yes  Have you had your routine dental cleaning in the past 6 months? no    Have you activated your [facility] [facility] account? If not, what are your barriers?  Yes     Patient Care Team:  CLIF Granados CNP as PCP - General (Family Nurse Practitioner)  CLIF Granados CNP as PCP - [facility]  Dave Aparicio MD as Consulting Physician (Gastroenterology)    Medical History Review  Past Medical, Family, and Social History reviewed and does contribute to the patient presenting condition    Health Maintenance   Topic Date Due    Hepatitis C screen  Never done    Diabetic foot exam  Never done    Diabetic retinal exam  Never done    HIV screen  Never done    Hepatitis B vaccine (1 of 3 - Risk 3-dose series) Never done    Annual Wellness Visit (AWV)  Never done    Diabetic microalbuminuria test  02/12/2021    A1C test (Diabetic or Prediabetic)  05/26/2021    Flu vaccine (Season Ended) 09/01/2021    Lipid screen  02/26/2022    DTaP/Tdap/Td vaccine (2 - Td or Tdap) 09/19/2028    Pneumococcal 0-64 years Vaccine (2 of 2) 06/16/2040    COVID-19 Vaccine  Completed    Hepatitis A vaccine  Aged Out    Hib vaccine  Aged Out    Meningococcal (ACWY) vaccine  Aged Out
35.00 kg/m²    Physical Exam  Constitutional:       General: He is not in acute distress. Appearance: He is well-developed. HENT:      Head: Normocephalic. Right Ear: External ear normal.      Left Ear: External ear normal.   Cardiovascular:      Rate and Rhythm: Normal rate and regular rhythm. Heart sounds: Normal heart sounds. Pulmonary:      Effort: Pulmonary effort is normal.      Breath sounds: Normal breath sounds. Musculoskeletal:         General: Normal range of motion. Skin:     General: Skin is warm and dry. Neurological:      Mental Status: He is alert and oriented to person, place, and time. Assessment/Plan         1. Type 2 diabetes mellitus with diabetic polyneuropathy, without long-term current use of insulin (HCC)  *will have lab draw a1c,  Diet and exercise discussed  Not willing to do an injectable med, he has a tattoo that is not finished, doesn't think he can do it. Used to be on jardiance. Doesn't want dm ed right now. Diet is limited d/t limited income  - Microalbumin, Ur; Future  - aspirin (SM ASPIRIN ADULT LOW STRENGTH) 81 MG EC tablet; TAKE 1 TABLET BY MOUTH DAILY  Dispense: 90 tablet; Refill: 1  - glimepiride (AMARYL) 2 MG tablet; TAKE I/2 TABLET BY MOUTH TWICE DAILY  Dispense: 90 tablet; Refill: 1  - metFORMIN (GLUCOPHAGE-XR) 500 MG extended release tablet; TAKE TWO TABLETS BY MOUTH TWICE A DAY WITH FOOD  Dispense: 120 tablet; Refill: 1  - Hemoglobin A1C; Future    2. Hyperlipidemia, unspecified hyperlipidemia type    - fenofibrate (TRICOR) 48 MG tablet; Take 1 tablet by mouth daily  Dispense: 90 tablet; Refill: 1    3. COVID-19 vaccine series started    - Covid-19, Antibody, Total; Future    4. Screening for HIV (human immunodeficiency virus)    - HIV Screen; Future    5. Encounter for hepatitis C screening test for low risk patient    - Hepatitis C Antibody; Future   .    RTO if symptoms worsen or fail to improve  Pt agreeable with plan      Patient

## 2021-06-29 DIAGNOSIS — E78.5 HYPERLIPIDEMIA, UNSPECIFIED HYPERLIPIDEMIA TYPE: ICD-10-CM

## 2021-06-29 DIAGNOSIS — Z76.0 MEDICATION REFILL: ICD-10-CM

## 2021-06-29 NOTE — TELEPHONE ENCOUNTER
Hemoglobin A1C (%)   Date Value   02/26/2021 13.7 (H)   09/10/2019 7.1   06/13/2019 8.2             ( goal A1C is < 7)   Microalb/Crt.  Ratio (mcg/mg creat)   Date Value   02/12/2020 235 (H)     LDL Cholesterol (mg/dL)   Date Value   02/26/2021            (goal LDL is <100)   AST (U/L)   Date Value   02/25/2021 39     ALT (U/L)   Date Value   02/25/2021 16     BUN (mg/dL)   Date Value   02/26/2021 9     BP Readings from Last 3 Encounters:   06/23/21 (!) 154/107   02/26/21 (!) 157/90   02/12/20 (!) 140/90          (goal 120/80)        Patient Active Problem List:     Diabetes mellitus ([facility] [address]     Gout     Hyperlipidemia     Chronic abdominal pain     Nausea & vomiting     Anxiety     Curvature of the penis     Bipolar 1 disorder ([facility] [address]     DKA, type 2, not at goal [facility])     Acute pancreatitis     Noncompliance with medication regimen      ----Román Boop

## 2021-06-30 RX ORDER — SIMVASTATIN 80 MG
TABLET ORAL
Qty: 90 TABLET | Refills: 1 | Status: SHIPPED | OUTPATIENT
Start: 2021-06-30

## 2021-06-30 RX ORDER — TAMSULOSIN HYDROCHLORIDE 0.4 MG/1
0.4 CAPSULE ORAL DAILY
Qty: 90 CAPSULE | Refills: 1 | Status: SHIPPED | OUTPATIENT
Start: 2021-06-30 | End: 2021-07-30

## 2021-08-30 DIAGNOSIS — Z76.0 MEDICATION REFILL: ICD-10-CM

## 2021-08-30 RX ORDER — LANCING DEVICE
EACH MISCELLANEOUS
Qty: 120 EACH | Refills: 0 | Status: SHIPPED | OUTPATIENT
Start: 2021-08-30

## 2021-08-30 RX ORDER — ALBUTEROL SULFATE 90 UG/1
AEROSOL, METERED RESPIRATORY (INHALATION)
Qty: 18 G | Refills: 0 | Status: SHIPPED | OUTPATIENT
Start: 2021-08-30

## 2021-08-30 NOTE — TELEPHONE ENCOUNTER
Patient is asking for refills. States he does not understand why these were not refilled at his appt. Informed patient that one was marked not taking and the inhaler was not on his med list.    Patient then started talking about the order for his blood work. States it is \"ridiculous to fast for blood work\". States he has to eat because he has digestive issues. Patient also stated he does not feel that his provider cares and stated he would like to see an MD. Patient stated the provider \"does not give a sh*t\" about his care and seems that she does not want to be around him. Patient states he does not want to see an MD here. Patient stated \"I want to see a doctor that was born in this country\". Patient states he has anxiety and cannot make phone calls to find another doctor. Advised patient he can call his insurance company and they can give him a list of providers in his network but he will have to call them to make an appt. Patient states\" and you wonder why nobody trust doctors\" and hung up. Did advise patient I cannot have him yelling and arguing but I will send a message regarding refills. Patient stated that he was not arguing or yelling and that I was getting offended.

## 2021-12-23 DIAGNOSIS — E11.42 TYPE 2 DIABETES MELLITUS WITH DIABETIC POLYNEUROPATHY, WITHOUT LONG-TERM CURRENT USE OF INSULIN (HCC): ICD-10-CM

## 2021-12-23 DIAGNOSIS — E78.5 HYPERLIPIDEMIA, UNSPECIFIED HYPERLIPIDEMIA TYPE: ICD-10-CM

## 2021-12-23 RX ORDER — FENOFIBRATE 48 MG/1
48 TABLET, COATED ORAL DAILY
Qty: 30 TABLET | Refills: 1 | OUTPATIENT
Start: 2021-12-23

## 2021-12-23 RX ORDER — GLIMEPIRIDE 2 MG/1
TABLET ORAL
Qty: 30 TABLET | Refills: 1 | OUTPATIENT
Start: 2021-12-23
